# Patient Record
Sex: FEMALE | Race: WHITE | NOT HISPANIC OR LATINO | Employment: OTHER | URBAN - NONMETROPOLITAN AREA
[De-identification: names, ages, dates, MRNs, and addresses within clinical notes are randomized per-mention and may not be internally consistent; named-entity substitution may affect disease eponyms.]

---

## 2020-01-15 ENCOUNTER — TRANSCRIBE ORDERS (OUTPATIENT)
Dept: ADMINISTRATIVE | Facility: HOSPITAL | Age: 75
End: 2020-01-15

## 2020-01-15 DIAGNOSIS — M25.532 PAIN IN LEFT WRIST: Primary | ICD-10-CM

## 2021-02-10 ENCOUNTER — IMMUNIZATION (OUTPATIENT)
Dept: VACCINE CLINIC | Facility: HOSPITAL | Age: 76
End: 2021-02-10

## 2021-02-10 PROCEDURE — 0001A: CPT | Performed by: INTERNAL MEDICINE

## 2021-02-10 PROCEDURE — 91300 HC SARSCOV02 VAC 30MCG/0.3ML IM: CPT | Performed by: INTERNAL MEDICINE

## 2021-03-04 ENCOUNTER — IMMUNIZATION (OUTPATIENT)
Dept: VACCINE CLINIC | Facility: HOSPITAL | Age: 76
End: 2021-03-04

## 2021-03-04 PROCEDURE — 0002A: CPT | Performed by: INTERNAL MEDICINE

## 2021-03-04 PROCEDURE — 91300 HC SARSCOV02 VAC 30MCG/0.3ML IM: CPT | Performed by: INTERNAL MEDICINE

## 2021-05-19 ENCOUNTER — TRANSCRIBE ORDERS (OUTPATIENT)
Dept: ADMINISTRATIVE | Facility: HOSPITAL | Age: 76
End: 2021-05-19

## 2021-05-19 DIAGNOSIS — M75.21 BICIPITAL TENDONITIS OF RIGHT SHOULDER: Primary | ICD-10-CM

## 2021-07-26 ENCOUNTER — APPOINTMENT (OUTPATIENT)
Dept: CT IMAGING | Facility: HOSPITAL | Age: 76
End: 2021-07-26

## 2021-07-26 ENCOUNTER — APPOINTMENT (OUTPATIENT)
Dept: PREADMISSION TESTING | Facility: HOSPITAL | Age: 76
End: 2021-07-26

## 2021-08-06 ENCOUNTER — APPOINTMENT (OUTPATIENT)
Dept: PREADMISSION TESTING | Facility: HOSPITAL | Age: 76
End: 2021-08-06

## 2022-01-21 ENCOUNTER — TRANSCRIBE ORDERS (OUTPATIENT)
Dept: LAB | Facility: HOSPITAL | Age: 77
End: 2022-01-21

## 2022-01-21 ENCOUNTER — LAB (OUTPATIENT)
Dept: LAB | Facility: HOSPITAL | Age: 77
End: 2022-01-21

## 2022-01-21 DIAGNOSIS — J02.9 ACUTE PHARYNGITIS, UNSPECIFIED ETIOLOGY: ICD-10-CM

## 2022-01-21 DIAGNOSIS — J02.9 ACUTE PHARYNGITIS, UNSPECIFIED ETIOLOGY: Primary | ICD-10-CM

## 2022-01-21 LAB
B PARAPERT DNA SPEC QL NAA+PROBE: NOT DETECTED
B PERT DNA SPEC QL NAA+PROBE: NOT DETECTED
C PNEUM DNA NPH QL NAA+NON-PROBE: NOT DETECTED
FLUAV SUBTYP SPEC NAA+PROBE: NOT DETECTED
FLUBV RNA ISLT QL NAA+PROBE: NOT DETECTED
HADV DNA SPEC NAA+PROBE: NOT DETECTED
HCOV 229E RNA SPEC QL NAA+PROBE: NOT DETECTED
HCOV HKU1 RNA SPEC QL NAA+PROBE: NOT DETECTED
HCOV NL63 RNA SPEC QL NAA+PROBE: NOT DETECTED
HCOV OC43 RNA SPEC QL NAA+PROBE: NOT DETECTED
HMPV RNA NPH QL NAA+NON-PROBE: NOT DETECTED
HPIV1 RNA ISLT QL NAA+PROBE: NOT DETECTED
HPIV2 RNA SPEC QL NAA+PROBE: NOT DETECTED
HPIV3 RNA NPH QL NAA+PROBE: NOT DETECTED
HPIV4 P GENE NPH QL NAA+PROBE: NOT DETECTED
M PNEUMO IGG SER IA-ACNC: NOT DETECTED
RHINOVIRUS RNA SPEC NAA+PROBE: NOT DETECTED
RSV RNA NPH QL NAA+NON-PROBE: NOT DETECTED
S PYO AG THROAT QL: NEGATIVE
SARS-COV-2 RNA NPH QL NAA+NON-PROBE: DETECTED

## 2022-01-21 PROCEDURE — 87081 CULTURE SCREEN ONLY: CPT

## 2022-01-21 PROCEDURE — 87880 STREP A ASSAY W/OPTIC: CPT

## 2022-01-21 PROCEDURE — 0202U NFCT DS 22 TRGT SARS-COV-2: CPT

## 2022-01-23 LAB — BACTERIA SPEC AEROBE CULT: NORMAL

## 2023-11-11 ENCOUNTER — HOSPITAL ENCOUNTER (EMERGENCY)
Facility: HOSPITAL | Age: 78
Discharge: HOME | End: 2023-11-11
Attending: EMERGENCY MEDICINE
Payer: MEDICARE

## 2023-11-11 ENCOUNTER — APPOINTMENT (OUTPATIENT)
Dept: GENERAL RADIOLOGY | Facility: HOSPITAL | Age: 78
End: 2023-11-11
Payer: MEDICARE

## 2023-11-11 VITALS
WEIGHT: 128 LBS | SYSTOLIC BLOOD PRESSURE: 179 MMHG | TEMPERATURE: 97.9 F | BODY MASS INDEX: 21.85 KG/M2 | RESPIRATION RATE: 18 BRPM | DIASTOLIC BLOOD PRESSURE: 82 MMHG | OXYGEN SATURATION: 95 % | HEIGHT: 64 IN | HEART RATE: 62 BPM

## 2023-11-11 DIAGNOSIS — S82.832A CLOSED FRACTURE OF DISTAL END OF LEFT FIBULA, UNSPECIFIED FRACTURE MORPHOLOGY, INITIAL ENCOUNTER: Primary | ICD-10-CM

## 2023-11-11 PROCEDURE — 73610 X-RAY EXAM OF ANKLE: CPT

## 2023-11-11 PROCEDURE — 99282 EMERGENCY DEPT VISIT SF MDM: CPT

## 2023-11-11 PROCEDURE — 99283 EMERGENCY DEPT VISIT LOW MDM: CPT

## 2023-11-11 RX ORDER — NITROGLYCERIN 0.4 MG/1
0.4 TABLET SUBLINGUAL
COMMUNITY

## 2023-11-11 RX ORDER — METOPROLOL SUCCINATE 50 MG
50 TABLET, EXTENDED RELEASE 24 HR ORAL DAILY
COMMUNITY

## 2023-11-11 RX ORDER — LISINOPRIL 20 MG/1
20 TABLET ORAL DAILY
COMMUNITY

## 2023-11-11 RX ORDER — ROSUVASTATIN CALCIUM 5 MG/1
5 TABLET, COATED ORAL NIGHTLY
COMMUNITY

## 2023-11-11 RX ORDER — WARFARIN SODIUM 2 MG/1
2 TABLET ORAL 3 TIMES WEEKLY
COMMUNITY

## 2023-11-11 RX ORDER — SERTRALINE HYDROCHLORIDE 25 MG/1
25 TABLET, FILM COATED ORAL DAILY
COMMUNITY

## 2023-11-11 RX ORDER — OXYCODONE HYDROCHLORIDE AND ACETAMINOPHEN 5; 325 MG/1; MG/1
1 TABLET ORAL EVERY 6 HOURS PRN
Qty: 10 TABLET | Refills: 0 | Status: SHIPPED | OUTPATIENT
Start: 2023-11-11

## 2023-11-11 RX ORDER — WARFARIN SODIUM 4 MG/1
4 TABLET ORAL
COMMUNITY

## 2023-11-11 NOTE — DISCHARGE INSTRUCTIONS
Wear the boot until you are seen by an orthopedic provider. Use the pain medication as needed. Use your cane to help as well.

## 2023-11-11 NOTE — ED PROVIDER NOTES
"Subjective:  History of Present Illness:    Patient is a 78-year-old female here today with left ankle pain.  She states that she was at a car lot walking around when she stepped off the curb and injured her left ankle.  Since then she has had pain and swelling near the lateral malleolus.  Has been able to bear weight but is painful.  No other injury sustained.      Nurses Notes reviewed and agree, including vitals, allergies, social history and prior medical history.     REVIEW OF SYSTEMS: All systems reviewed and not pertinent unless noted.  Review of Systems    Past Medical History:   Diagnosis Date    Hyperlipidemia     Hypertension     MI (myocardial infarction)        Allergies:    Morphine and Sulfa antibiotics      Past Surgical History:   Procedure Laterality Date    CORONARY ANGIOPLASTY WITH STENT PLACEMENT      TUBAL ABDOMINAL LIGATION           Social History     Socioeconomic History    Marital status:    Tobacco Use    Smoking status: Former     Types: Cigarettes    Smokeless tobacco: Never   Vaping Use    Vaping Use: Never used   Substance and Sexual Activity    Alcohol use: Not Currently    Drug use: Never    Sexual activity: Yes     Partners: Male         History reviewed. No pertinent family history.    Objective  Physical Exam:  /82 (BP Location: Left arm, Patient Position: Sitting)   Pulse 62   Temp 97.9 °F (36.6 °C) (Oral)   Resp 18   Ht 162.6 cm (64\")   Wt 58.1 kg (128 lb)   SpO2 95%   BMI 21.97 kg/m²      Physical Exam  Vitals and nursing note reviewed.   Constitutional:       General: She is not in acute distress.     Appearance: Normal appearance. She is normal weight. She is not ill-appearing.   HENT:      Head: Normocephalic and atraumatic.      Nose: Nose normal.   Cardiovascular:      Rate and Rhythm: Normal rate.      Pulses: Normal pulses.   Pulmonary:      Effort: Pulmonary effort is normal.   Musculoskeletal:      Left ankle: Swelling present. Tenderness present " over the lateral malleolus. Decreased range of motion.   Skin:     General: Skin is warm and dry.      Capillary Refill: Capillary refill takes less than 2 seconds.   Neurological:      General: No focal deficit present.      Mental Status: She is alert and oriented to person, place, and time.   Psychiatric:         Mood and Affect: Mood normal.         Behavior: Behavior normal.         Procedures    ED Course:         Lab Results (last 24 hours)       ** No results found for the last 24 hours. **             XR Ankle 3+ View Left    Result Date: 11/11/2023  LEFT ANKLE  HISTORY: Fall off sidewalk.  FINDINGS:  A three view exam demonstrates an acute minimally displaced fracture of the lateral malleolus. No other fractures. Tibiotalar alignment is normal. Small plantar calcaneal spur.       Impression: Acute minimally displaced fracture of the lateral malleolus.   This report was signed and finalized on 11/11/2023 1:38 PM by Dr Dariusz Tierney DO.          [facility]      Initial impression of presenting illness: Patient is a 78-year-old female here today with left ankle pain.    DDX: includes but is not limited to: Fibular fracture, tibia fracture, ankle sprain, among others    Patient arrives hemodynamically stable with vitals interpreted by myself.     Pertinent features from physical exam: Ankle pain and swelling as noted above.    Initial diagnostic plan: Ankle x-ray placed by triage staff    Results from initial plan were reviewed and interpreted by me revealing fibular fracture noted.    Diagnostic information from other sources: Medical record    Interventions / Re-evaluation: Patient was offered pain medication but denied need.    Results/clinical rationale were discussed with Dr. Blake who also reviewed the x-rays.  Patient able to be placed in a long boot.  Offered crutches but preferred to use a cane instead.  Provided her with information for orthopedic follow-up.    Consultations/Discussion of results with other  physicians: None    Disposition plan: Patient discharged home in stable condition.  -----        Final diagnoses:   Closed fracture of distal end of left fibula, unspecified fracture morphology, initial encounter          Rob Keith, APRN  11/11/23 1989

## 2024-02-26 ENCOUNTER — HOSPITAL ENCOUNTER (OUTPATIENT)
Dept: LAB | Age: 79
Discharge: HOME | End: 2024-02-26

## 2024-02-26 DIAGNOSIS — R79.1 ABNORMAL COAGULATION PROFILE: Primary | ICD-10-CM

## 2024-02-26 DIAGNOSIS — D68.61 ANTIPHOSPHOLIPID SYNDROME (CMD): ICD-10-CM

## 2024-02-26 LAB
INR PPP: 1.9
PROTHROMBIN TIME: 19.8 SEC (ref 9.7–11.8)

## 2024-02-26 PROCEDURE — 36415 COLL VENOUS BLD VENIPUNCTURE: CPT | Performed by: INTERNAL MEDICINE

## 2024-02-26 PROCEDURE — 85610 PROTHROMBIN TIME: CPT | Performed by: INTERNAL MEDICINE

## 2024-02-28 ENCOUNTER — HOSPITAL ENCOUNTER (OUTPATIENT)
Dept: LAB | Age: 79
Discharge: HOME | End: 2024-02-28

## 2024-02-28 DIAGNOSIS — D68.61 ANTIPHOSPHOLIPID SYNDROME  (CMD): Primary | ICD-10-CM

## 2024-02-28 LAB
INR PPP: 2.2
PROTHROMBIN TIME: 22.8 SEC (ref 9.7–11.8)

## 2024-02-28 PROCEDURE — 85610 PROTHROMBIN TIME: CPT | Performed by: INTERNAL MEDICINE

## 2024-02-28 PROCEDURE — 36415 COLL VENOUS BLD VENIPUNCTURE: CPT | Performed by: INTERNAL MEDICINE

## 2024-04-02 ENCOUNTER — OFFICE VISIT (OUTPATIENT)
Dept: NEUROSURGERY | Facility: CLINIC | Age: 79
End: 2024-04-02
Payer: MEDICARE

## 2024-04-02 ENCOUNTER — TELEPHONE (OUTPATIENT)
Dept: NEUROSURGERY | Facility: CLINIC | Age: 79
End: 2024-04-02

## 2024-04-02 VITALS
WEIGHT: 117.9 LBS | BODY MASS INDEX: 20.13 KG/M2 | DIASTOLIC BLOOD PRESSURE: 70 MMHG | HEIGHT: 64 IN | SYSTOLIC BLOOD PRESSURE: 140 MMHG

## 2024-04-02 DIAGNOSIS — M54.6 PAIN IN THORACIC SPINE: Primary | ICD-10-CM

## 2024-04-02 PROCEDURE — 99204 OFFICE O/P NEW MOD 45 MIN: CPT | Performed by: PHYSICIAN ASSISTANT

## 2024-04-02 RX ORDER — PANTOPRAZOLE SODIUM 40 MG/1
40 TABLET, DELAYED RELEASE ORAL DAILY
COMMUNITY
Start: 2024-02-24

## 2024-04-02 RX ORDER — UBIDECARENONE 100 MG
100 CAPSULE ORAL DAILY
COMMUNITY

## 2024-04-02 RX ORDER — ACETAMINOPHEN 325 MG/1
325-650 TABLET ORAL EVERY 4 HOURS PRN
COMMUNITY

## 2024-04-02 RX ORDER — CLOPIDOGREL BISULFATE 75 MG/1
75 TABLET ORAL DAILY
COMMUNITY
Start: 2024-02-24

## 2024-04-02 RX ORDER — SODIUM CHLORIDE 9 MG/ML
1000 INJECTION, SOLUTION INTRAVENOUS
COMMUNITY
Start: 2024-02-20

## 2024-04-02 RX ORDER — WARFARIN SODIUM 4 MG/1
4 TABLET ORAL DAILY
COMMUNITY
Start: 2024-02-23

## 2024-04-02 RX ORDER — DICYCLOMINE HYDROCHLORIDE 10 MG/1
10 CAPSULE ORAL
COMMUNITY

## 2024-04-02 RX ORDER — ENOXAPARIN SODIUM 100 MG/ML
40 INJECTION SUBCUTANEOUS
COMMUNITY
Start: 2024-02-23

## 2024-04-02 RX ORDER — EVOLOCUMAB 140 MG/ML
140 INJECTION, SOLUTION SUBCUTANEOUS
COMMUNITY
Start: 2024-03-26

## 2024-04-02 NOTE — TELEPHONE ENCOUNTER
PATIENT CALLED IN AND STATES SHE IS STUCK IN TRAFFIC; SHOULD BE THERE IN ABOUT 46 MINUTES - ATTEMPTED WT AND NOT SUCCESSFUL.  PLEASE CALL PATIENT IF OK TO BE LATE OR TO RESCHEDULE     CALL HER [phone]     THANK YOU!

## 2024-04-02 NOTE — PROGRESS NOTES
Patient: Priscilla Purdy  : 1945  Chart #: [id number]    Date of Service: 2024    Chief Complaint   Patient presents with    Back Pain         Back Pain    Ms. Santiago is an 79-year-old female from [facility] who presents with chronic thoracic pain    Chronic Illnesses:    Past Medical History:   Diagnosis Date    Coronary artery disease     Hyperlipidemia     Hypertension     MI (myocardial infarction)          Past Surgical History:   Procedure Laterality Date    CAROTID STENT      CORONARY ANGIOPLASTY WITH STENT PLACEMENT      TUBAL ABDOMINAL LIGATION         Allergies   Allergen Reactions    Morphine Hives    Sulfa Antibiotics Hives         Current Outpatient Medications:     acetaminophen (TYLENOL) 325 MG tablet, Take 1-2 tablets by mouth Every 4 (Four) Hours As Needed., Disp: , Rfl:     clopidogrel (PLAVIX) 75 MG tablet, Take 1 tablet by mouth Daily., Disp: , Rfl:     coenzyme Q10 100 MG capsule, Take 1 capsule by mouth Daily., Disp: , Rfl:     dicyclomine (BENTYL) 10 MG capsule, Take 1 capsule by mouth., Disp: , Rfl:     Enoxaparin Sodium (LOVENOX) 40 MG/0.4ML solution prefilled syringe syringe, Inject 0.4 mL under the skin into the appropriate area as directed., Disp: , Rfl:     lisinopril (PRINIVIL,ZESTRIL) 20 MG tablet, Take 1 tablet by mouth Daily., Disp: , Rfl:     nitroglycerin (NITROSTAT) 0.4 MG SL tablet, Place 1 tablet under the tongue Every 5 (Five) Minutes As Needed., Disp: , Rfl:     oxyCODONE-acetaminophen (PERCOCET) 5-325 MG per tablet, Take 1 tablet by mouth Every 6 (Six) Hours As Needed for Severe Pain., Disp: 10 tablet, Rfl: 0    pantoprazole (PROTONIX) 40 MG EC tablet, Take 1 tablet by mouth Daily., Disp: , Rfl:     Repatha SureClick solution auto-injector SureClick injection, Inject 1 mL under the skin into the appropriate area as directed., Disp: , Rfl:     rosuvastatin (CRESTOR) 5 MG tablet, Take 1 tablet by mouth Every Night., Disp: , Rfl:     sertraline (ZOLOFT) 25 MG  tablet, Take 1 tablet by mouth Daily., Disp: , Rfl:     sodium chloride 0.9 % solution, Infuse 1,000 mL into a venous catheter., Disp: , Rfl:     Toprol XL 50 MG 24 hr tablet, Take 1 tablet by mouth Daily., Disp: , Rfl:     warfarin (COUMADIN) 2 MG tablet, Take 1 tablet by mouth 3 (Three) Times a Week., Disp: , Rfl:     warfarin (COUMADIN) 4 MG tablet, Take 1 tablet by mouth Daily., Disp: , Rfl:     lidocaine (LIDODERM) 5 %, Place 1 patch on the skin as directed by provider Daily. Remove & Discard patch within 12 hours or as directed by MD, Disp: 30 patch, Rfl: 1    tiZANidine (ZANAFLEX) 2 MG tablet, Take 1 tablet by mouth 2 (Two) Times a Day As Needed for Muscle Spasms., Disp: 20 tablet, Rfl: 1    warfarin (Coumadin) 4 MG tablet, Take 1 tablet by mouth 3 (Three) Times a Week if Needed. (Patient not taking: Reported on 4/2/2024), Disp: , Rfl:     Social History     Socioeconomic History    Marital status:    Tobacco Use    Smoking status: Former     Types: Cigarettes    Smokeless tobacco: Never   Vaping Use    Vaping status: Never Used   Substance and Sexual Activity    Alcohol use: Not Currently    Drug use: Never    Sexual activity: Yes     Partners: Male       History reviewed. No pertinent family history.    BMI is within normal parameters. No other follow-up for BMI required.       Social History    Tobacco Use      Smoking status: Former        Types: Cigarettes      Smokeless tobacco: Never       Review of Systems   Constitutional:  Positive for activity change, appetite change, chills and fatigue.   Musculoskeletal:  Positive for back pain.        Gait & Balance Assessment:  Risk assessment for falls. Fall precautions:  such as;   Using gait aids a cane, walker at the appropriate height at all times for ambulation or if necessary a wheelchair  Removing all area rugs and coffee tables to create a safe environment at home  Ensure clean, dry floors  Wearing supportive footwear and properly fitting  "clothing  Ensure bed/chair is appropriate height and patient's feet can touch the floor  Using a shower transfer bench  Using walk-in shower and having shower safety bars installed  Ensure proper lighting, minimize glare  Have nightlights operational and in use  Participation in an exercise program for gait training, balance training and strength  Avoid carrying laundry up and down steps  Ensure proper compliance and organization of medications to avoid errors   Avoid use of over the counter sedatives and alcohol consumption  Ensure easy access to call bell, glasses, TV control, telephone  Ensure glasses/hearing aids are in use or close by (on top of night table)     Physical examination:  Blood pressure 140/70, height 162.6 cm (64\"), weight 53.5 kg (117 lb 14.4 oz).  HEENT- normocephalic, atraumatic, sclera clear  Lungs-normal expansion, no wheezing  Heart-regular rate and rhythm  Extremities-positive pulses, no edema    Neurologic Exam  WDWNWF  A/A/C, speech clear, attention normal, conversant, answers questions appropriately, good historian.  Cranial nerves II through XII are intact.  Motor examination does not reveal weakness in the , upper or lower extremities.   Sensation is intact.  Gait is normal, balance is normal.     Radiographic Imaging:  For my review is a MRI from [place]. Pictures are poor for surgical planning.    Medical Decision Making  Diagnoses and all orders for this visit:    1. Pain in thoracic spine (Primary)  -     CT Thoracic Spine Without Contrast; Future    Other orders  -     lidocaine (LIDODERM) 5 %; Place 1 patch on the skin as directed by provider Daily. Remove & Discard patch within 12 hours or as directed by MD  Dispense: 30 patch; Refill: 1  -     Discontinue: TiZANidine (Zanaflex) 2 MG capsule; Take 1 capsule by mouth 2 (Two) Times a Day As Needed for Muscle Spasms.  Dispense: 20 capsule; Refill: 1         Any copied data from previous notes included in the (1) HPI, (2) PE, (3) " MDM and/or assessment and plan has been reviewed and is accurate as of this day.    Apple Marie, PAC    Patient Care Team:  Teddy East MD as PCP - General (Internal Medicine)  Teddy East MD as Primary Care Provider (Internal Medicine)  Los Tinsley MD as Consulting Physician (Neurosurgery)  Dorita Miner PA as Referring Physician (Pain Medicine)

## 2024-04-03 ENCOUNTER — TELEPHONE (OUTPATIENT)
Dept: NEUROSURGERY | Facility: CLINIC | Age: 79
End: 2024-04-03
Payer: MEDICARE

## 2024-04-03 PROBLEM — M54.6 PAIN IN THORACIC SPINE: Status: ACTIVE | Noted: 2024-04-03

## 2024-04-03 RX ORDER — TIZANIDINE HYDROCHLORIDE 2 MG/1
2 CAPSULE, GELATIN COATED ORAL 2 TIMES DAILY PRN
Qty: 20 CAPSULE | Refills: 1 | Status: SHIPPED | OUTPATIENT
Start: 2024-04-03 | End: 2024-04-05 | Stop reason: SDUPTHER

## 2024-04-03 RX ORDER — LIDOCAINE 50 MG/G
1 PATCH TOPICAL EVERY 24 HOURS
Qty: 30 PATCH | Refills: 1 | Status: SHIPPED | OUTPATIENT
Start: 2024-04-03

## 2024-04-03 NOTE — TELEPHONE ENCOUNTER
Provider:  Frank  Surgery/Procedure:  KAT  Surgery/Procedure Date:    Last visit:   4/2/24  Next visit: 4/1//24     Reason for call:  Patient called in regards to new prescriptions that were discussed yesterday, she has not received any new prescriptions at her pharmacy.

## 2024-04-05 DIAGNOSIS — M54.6 PAIN IN THORACIC SPINE: Primary | ICD-10-CM

## 2024-04-05 RX ORDER — TIZANIDINE 2 MG/1
2 TABLET ORAL 2 TIMES DAILY PRN
Qty: 20 TABLET | Refills: 1 | Status: SHIPPED | OUTPATIENT
Start: 2024-04-05

## 2024-04-10 ENCOUNTER — HOSPITAL ENCOUNTER (OUTPATIENT)
Dept: CT IMAGING | Facility: HOSPITAL | Age: 79
Discharge: HOME | End: 2024-04-10
Admitting: PHYSICIAN ASSISTANT
Payer: MEDICARE

## 2024-04-10 DIAGNOSIS — M54.6 PAIN IN THORACIC SPINE: ICD-10-CM

## 2024-04-10 PROCEDURE — 72128 CT CHEST SPINE W/O DYE: CPT

## 2024-04-15 ENCOUNTER — TELEPHONE (OUTPATIENT)
Dept: NEUROSURGERY | Facility: CLINIC | Age: 79
End: 2024-04-15
Payer: MEDICARE

## 2024-04-15 NOTE — TELEPHONE ENCOUNTER
Documentation Only: I have sent jeremias keys Tizanidine. Pending  This was denied because it had been capsule it was changed to tablets and patient picked them up.

## 2024-04-24 ENCOUNTER — OFFICE VISIT (OUTPATIENT)
Dept: NEUROSURGERY | Facility: CLINIC | Age: 79
End: 2024-04-24
Payer: MEDICARE

## 2024-04-24 VITALS
SYSTOLIC BLOOD PRESSURE: 122 MMHG | WEIGHT: 115 LBS | TEMPERATURE: 97.7 F | DIASTOLIC BLOOD PRESSURE: 60 MMHG | HEIGHT: 64 IN | BODY MASS INDEX: 19.63 KG/M2

## 2024-04-24 DIAGNOSIS — M54.6 PAIN IN THORACIC SPINE: Primary | ICD-10-CM

## 2024-04-24 PROBLEM — M47.815 OSTEOARTHRITIS OF THORACOLUMBAR SPINE: Status: ACTIVE | Noted: 2024-04-24

## 2024-04-24 PROCEDURE — 99214 OFFICE O/P EST MOD 30 MIN: CPT | Performed by: PHYSICIAN ASSISTANT

## 2024-04-24 RX ORDER — LEVOTHYROXINE SODIUM 88 UG/1
1 TABLET ORAL DAILY
COMMUNITY

## 2024-04-24 RX ORDER — BUTALBITAL, ACETAMINOPHEN AND CAFFEINE 50; 325; 40 MG/1; MG/1; MG/1
TABLET ORAL
COMMUNITY
Start: 2024-04-03

## 2024-04-24 NOTE — PROGRESS NOTES
Patient: Priscilla Purdy  : 1945  Chart #: [id number]    Date of Service: 2024    CC:  Back Pain  Pertinent negatives include no abdominal pain, chest pain, dysuria, fever, headaches, numbness or weakness.   Ms. Santiago is an 79-year-old female from [facility] who presents with chronic thoracic pain.  On today's follow-up visit with the patient and her spouse report that sometimes her pain will be from a different place in her thoracic spine and sometimes to the lateral right ribs.  She is inquiring about a brace.    Chronic Illnesses:  COPD  Past Medical History:   Diagnosis Date    Coronary artery disease     Hyperlipidemia     Hypertension     MI (myocardial infarction)          Past Surgical History:   Procedure Laterality Date    CAROTID STENT      CORONARY ANGIOPLASTY WITH STENT PLACEMENT      TUBAL ABDOMINAL LIGATION         Allergies   Allergen Reactions    Morphine Hives    Sulfa Antibiotics Hives    Nifedipine Headache and Rash     headache         Current Outpatient Medications:     acetaminophen (TYLENOL) 325 MG tablet, Take 1-2 tablets by mouth Every 4 (Four) Hours As Needed., Disp: , Rfl:     butalbital-acetaminophen-caffeine (FIORICET, ESGIC) -40 MG per tablet, Take 1 tablet every 4 hours by oral route., Disp: , Rfl:     clopidogrel (PLAVIX) 75 MG tablet, Take 1 tablet by mouth Daily., Disp: , Rfl:     coenzyme Q10 100 MG capsule, Take 1 capsule by mouth Daily., Disp: , Rfl:     dicyclomine (BENTYL) 10 MG capsule, Take 1 capsule by mouth., Disp: , Rfl:     lidocaine (LIDODERM) 5 %, Place 1 patch on the skin as directed by provider Daily. Remove & Discard patch within 12 hours or as directed by MD, Disp: 30 patch, Rfl: 1    lisinopril (PRINIVIL,ZESTRIL) 20 MG tablet, Take 1 tablet by mouth Daily., Disp: , Rfl:     nitroglycerin (NITROSTAT) 0.4 MG SL tablet, Place 1 tablet under the tongue Every 5 (Five) Minutes As Needed., Disp: , Rfl:     pantoprazole (PROTONIX) 40 MG EC tablet,  Take 1 tablet by mouth Daily., Disp: , Rfl:     Repatha SureClick solution auto-injector SureClick injection, Inject 1 mL under the skin into the appropriate area as directed., Disp: , Rfl:     rosuvastatin (CRESTOR) 5 MG tablet, Take 1 tablet by mouth Every Night., Disp: , Rfl:     sertraline (ZOLOFT) 25 MG tablet, Take 1 tablet by mouth Daily., Disp: , Rfl:     sodium chloride 0.9 % solution, Infuse 1,000 mL into a venous catheter., Disp: , Rfl:     tiZANidine (ZANAFLEX) 2 MG tablet, Take 1 tablet by mouth 2 (Two) Times a Day As Needed for Muscle Spasms., Disp: 20 tablet, Rfl: 1    Toprol XL 50 MG 24 hr tablet, Take 1 tablet by mouth Daily., Disp: , Rfl:     warfarin (COUMADIN) 2 MG tablet, Take 1 tablet by mouth 3 (Three) Times a Week., Disp: , Rfl:     warfarin (Coumadin) 4 MG tablet, Take 1 tablet by mouth 3 (Three) Times a Week if Needed., Disp: , Rfl:     warfarin (COUMADIN) 4 MG tablet, Take 1 tablet by mouth Daily., Disp: , Rfl:     Enoxaparin Sodium (LOVENOX) 40 MG/0.4ML solution prefilled syringe syringe, Inject 0.4 mL under the skin into the appropriate area as directed. (Patient not taking: Reported on 4/24/2024), Disp: , Rfl:     levothyroxine (SYNTHROID, LEVOTHROID) 88 MCG tablet, Take 1 tablet by mouth Daily., Disp: , Rfl:     Social History     Socioeconomic History    Marital status:    Tobacco Use    Smoking status: Former     Types: Cigarettes     Passive exposure: Never    Smokeless tobacco: Never   Vaping Use    Vaping status: Never Used   Substance and Sexual Activity    Alcohol use: Not Currently    Drug use: Never    Sexual activity: Yes     Partners: Male       No family history on file.    BMI is within normal parameters. No other follow-up for BMI required.       Social History    Tobacco Use      Smoking status: Former        Types: Cigarettes        Passive exposure: Never      Smokeless tobacco: Never       Review of Systems   Constitutional:  Positive for activity change,  appetite change, chills and fatigue. Negative for diaphoresis, fever and unexpected weight change.   HENT:  Positive for rhinorrhea and tinnitus. Negative for congestion, dental problem, drooling, ear discharge, ear pain, facial swelling, hearing loss, mouth sores, nosebleeds, postnasal drip, sinus pressure, sinus pain, sneezing, sore throat, trouble swallowing and voice change.    Eyes:  Negative for photophobia, pain, discharge, redness, itching and visual disturbance.   Respiratory:  Negative for apnea, cough, choking, chest tightness, shortness of breath, wheezing and stridor.    Cardiovascular:  Negative for chest pain, palpitations and leg swelling.   Gastrointestinal:  Negative for abdominal distention, abdominal pain, anal bleeding, blood in stool, constipation, diarrhea, nausea, rectal pain and vomiting.   Endocrine: Positive for cold intolerance. Negative for heat intolerance, polydipsia, polyphagia and polyuria.   Genitourinary:  Positive for flank pain. Negative for decreased urine volume, difficulty urinating, dysuria, enuresis, frequency, genital sores, hematuria and urgency.   Musculoskeletal:  Positive for back pain. Negative for arthralgias, gait problem, joint swelling, myalgias, neck pain and neck stiffness.   Skin:  Negative for color change, pallor, rash and wound.   Allergic/Immunologic: Negative for environmental allergies, food allergies and immunocompromised state.   Neurological:  Negative for dizziness, tremors, seizures, syncope, facial asymmetry, speech difficulty, weakness, light-headedness, numbness and headaches.   Hematological:  Negative for adenopathy. Does not bruise/bleed easily.   Psychiatric/Behavioral:  Negative for agitation, behavioral problems, confusion, decreased concentration, dysphoric mood, hallucinations, self-injury, sleep disturbance and suicidal ideas. The patient is not nervous/anxious and is not hyperactive.    All other systems reviewed and are negative.    "    Gait & Balance Assessment:  Risk assessment for falls. Fall precautions:  such as;   Using gait aids a cane, walker at the appropriate height at all times for ambulation or if necessary a wheelchair  Removing all area rugs and coffee tables to create a safe environment at home  Ensure clean, dry floors  Wearing supportive footwear and properly fitting clothing  Ensure bed/chair is appropriate height and patient's feet can touch the floor  Using a shower transfer bench  Using walk-in shower and having shower safety bars installed  Ensure proper lighting, minimize glare  Have nightlights operational and in use  Participation in an exercise program for gait training, balance training and strength  Avoid carrying laundry up and down steps  Ensure proper compliance and organization of medications to avoid errors   Avoid use of over the counter sedatives and alcohol consumption  Ensure easy access to call bell, glasses, TV control, telephone  Ensure glasses/hearing aids are in use or close by (on top of night table)     Physical examination:  Blood pressure 122/60, temperature 97.7 °F (36.5 °C), temperature source Infrared, height 162.6 cm (64\"), weight 52.2 kg (115 lb).  HEENT- normocephalic, atraumatic, sclera clear  Lungs-normal expansion, no wheezing  Heart-regular rate and rhythm  Extremities-positive pulses, no edema    Neurologic Exam  WDWNWF  A/A/C, speech clear, attention normal, conversant, answers questions appropriately, good historian.  Cranial nerves II through XII are intact.  Motor examination does not reveal weakness in the , upper or lower extremities.   Sensation is intact.  Gait is normal, balance is normal.     Radiographic Imaging:  For my review, CT scan of the thoracic spine, 4/10/2024 shows sclerotic spondylitic endplate changes at T11-12 and L1-2.  Thoracic kyphosis is noted.  There is a mild rightward scoliotic thoracic curvature.      Medical Decision Making  Diagnoses and all orders " for this visit:    1. Pain in thoracic spine (Primary)    I have ordered the patient a custom anterior-opening Flexfoam TLSO for spine stabilization, comfort and pain control. The patient requires a custom brace because of kyphosis, age (elderly) , frailness, and pain that prevents them from wearing a prefabricated brace. The patient must wear the brace when up and out of bed during activities for the next 8-12 weeks. The patient can don/doff the TLSO in the standing position.   She is currently in physical therapy and I have encouraged her to continue this program and do daily exercises at home.  I have also talked with her  and her about daily use of Voltaren cream and they are going to work out a schedule.  I would like to have a follow-up telemedicine visit in approximately 2 weeks to see how she is doing.    Any copied data from previous notes included in the (1) HPI, (2) PE, (3) MDM and/or assessment and plan has been reviewed and is accurate as of this day.    Apple Marie, PAC    Patient Care Team:  Teddy East MD as PCP - General (Internal Medicine)  Teddy East MD as Primary Care Provider (Internal Medicine)  Los Tinsley MD as Consulting Physician (Neurosurgery)  Dorita Miner PA as Referring Physician (Pain Medicine)

## 2024-05-23 ENCOUNTER — OFFICE VISIT (OUTPATIENT)
Dept: PULMONOLOGY | Facility: CLINIC | Age: 79
End: 2024-05-23
Payer: MEDICARE

## 2024-05-23 VITALS
OXYGEN SATURATION: 97 % | SYSTOLIC BLOOD PRESSURE: 124 MMHG | HEART RATE: 62 BPM | DIASTOLIC BLOOD PRESSURE: 60 MMHG | BODY MASS INDEX: 20.42 KG/M2 | RESPIRATION RATE: 18 BRPM | WEIGHT: 119.6 LBS | HEIGHT: 64 IN

## 2024-05-23 DIAGNOSIS — Z87.891 PERSONAL HISTORY OF NICOTINE DEPENDENCE: ICD-10-CM

## 2024-05-23 DIAGNOSIS — R06.02 SHORTNESS OF BREATH: Primary | ICD-10-CM

## 2024-05-23 DIAGNOSIS — R06.02 SHORTNESS OF BREATH: ICD-10-CM

## 2024-05-23 DIAGNOSIS — J43.9 PULMONARY EMPHYSEMA, UNSPECIFIED EMPHYSEMA TYPE: ICD-10-CM

## 2024-05-23 DIAGNOSIS — R93.89 ABNORMAL CT OF THE CHEST: ICD-10-CM

## 2024-05-23 RX ORDER — FUROSEMIDE 20 MG/1
20 TABLET ORAL 2 TIMES DAILY
COMMUNITY

## 2024-05-23 NOTE — PROGRESS NOTES
"  CONSULT NOTE    Requested by:   Teddy East*   Teddy East MD      Chief Complaint   Patient presents with    Breathing Problem    Consult       Subjective:  Priscilla Purdy is a 79 y.o. female.   Patient comes in today for consultation because of shortness of breath.    The patient says that she has noticed occasional shortness of breath with exertion, especially when climbing uphill, over the past few years and actually had myocardial infarction in February 2024 requiring stent placements.    The patient says that despite stent placements, her symptoms never completely improved     The patient says that she used to smoke 1 PPD for about 10-15 years. Quit 45 years ago.     She also says that her  and her mother smoked heavily indoors.     The patient has been exposed to smoke in the form of wood for heating occasionally.    The following portions of the patient's history were reviewed and updated as appropriate: allergies, current medications, past family history, past medical history, past social history, and past surgical history.    Review of Systems   HENT:  Negative for sinus pressure, sneezing and sore throat.    Respiratory:  Positive for cough. Negative for chest tightness, shortness of breath and wheezing.    Psychiatric/Behavioral:  Negative for sleep disturbance.    All other systems reviewed and are negative.      Past Medical History:   Diagnosis Date    Coronary artery disease     Hyperlipidemia     Hypertension     MI (myocardial infarction)        Social History     Tobacco Use    Smoking status: Former     Types: Cigarettes     Passive exposure: Never    Smokeless tobacco: Never   Substance Use Topics    Alcohol use: Not Currently         Objective:  Visit Vitals  /60   Pulse 62   Resp 18   Ht 162.6 cm (64.02\")   Wt 54.3 kg (119 lb 9.6 oz)   SpO2 97%   BMI 20.52 kg/m²       Physical Exam  Vitals reviewed.   Constitutional:       Appearance: She is " well-developed.   HENT:      Nose:      Comments: Mild nasal erythema noted.     Mouth/Throat:      Mouth: Mucous membranes are moist.   Neck:      Vascular: No JVD.   Cardiovascular:      Rate and Rhythm: Normal rate and regular rhythm.   Pulmonary:      Effort: Pulmonary effort is normal.      Breath sounds: No wheezing or rales.      Comments: Somewhat hyperresonant to percussion.  Somewhat decreased air entry.  No wheezing noted.   Musculoskeletal:      Cervical back: Neck supple.      Right lower leg: No edema.      Left lower leg: No edema.      Comments: Gait was normal.   Skin:     General: Skin is warm and dry.   Neurological:      Mental Status: She is alert and oriented to person, place, and time.         Assessment/Plan:  Diagnoses and all orders for this visit:    1. Shortness of breath (Primary)  -     Complete PFT - Pre & Post Bronchodilator; Future    2. Pulmonary emphysema, unspecified emphysema type  -     Complete PFT - Pre & Post Bronchodilator; Future  -     Alpha - 1 - Antitrypsin Phenotype; Future  -     CT Chest Without Contrast Diagnostic; Future    3. Personal history of nicotine dependence    4. Abnormal CT of the chest  -     Alpha - 1 - Antitrypsin Phenotype; Future  -     CT Chest Without Contrast Diagnostic; Future    Other orders  -     tiotropium bromide-olodaterol (STIOLTO RESPIMAT) 2.5-2.5 MCG/ACT aerosol solution inhaler; Inhale 2 puffs Daily.  Dispense: 1 each; Refill: 5        Return in about 4 months (around 9/23/2024) for Recheck, Imaging, Labs, For [facility] ([place]), ....Also 10 mths w/ Dr. Up.    DISCUSSION(if any):  I have also reviewed her primary care / referring provider's last note that mentions dyspnea.     ===========================  ===========================    Last CT scan results was reviewed in great detail with the patient.  March 2024.  1. No evidence of pulmonary embolism.     2. Moderate emphysema.     3. Patchy multifocal mild groundglass opacities  "and cystic change in the upper lobes. Findings are nonspecific, but could be related to infectious/inflammatory process or scarring. Recommend attention on follow-up chest CT in 6 months.     4. Mild mediastinal and hilar lymphadenopathy. Recommend attention on follow-up, as above.       Laboratory workup also showed   Lab Results   Component Value Date    HGB 12.6 02/23/2024    HGB 11.7 02/22/2024    HGB 14.1 02/20/2024   ,   Lab Results   Component Value Date    HCT 39.8 02/23/2024    HCT 36.7 02/22/2024    HCT 44.0 02/20/2024     Laboratory workup also showed No results found for: \"CO2\",   Lab Results   Component Value Date    HGBA1C 5.3 02/20/2024   ,   Lab Results   Component Value Date    TSH 0.783 03/29/2024    TSH 1.693 10/23/2023    TSH 0.297 (L) 08/04/2023     PFTs were reviewed. Moderate obstruction    ===========================  ===========================    Orders as above    Based on history and physical exam, it seems that her shortness of breath is most likely from obstructive lung disease.     Patient was educated on compliance with, and the correct method of using the pulmonary medicines.     Side effects, of prescribed medicines, discussed.    I have told her that we will made further recommendations, based on clinical response.     The patient will need repeat CT in 6 months from the last CT.     Patient was given reading material, as appropriate.     I have encouraged the patient to call or schedule a visit earlier, if there are any concerns.        Dictated utilizing Dragon dictation.    This document was electronically signed by Edith Up MD on 05/23/24 at 14:24 EDT    "

## 2024-05-24 ENCOUNTER — OFFICE VISIT (OUTPATIENT)
Dept: NEUROSURGERY | Facility: CLINIC | Age: 79
End: 2024-05-24
Payer: MEDICARE

## 2024-05-24 VITALS — WEIGHT: 118 LBS | BODY MASS INDEX: 20.14 KG/M2 | TEMPERATURE: 98 F | HEIGHT: 64 IN

## 2024-05-24 DIAGNOSIS — M54.6 PAIN IN THORACIC SPINE: Primary | ICD-10-CM

## 2024-05-24 NOTE — PROGRESS NOTES
Patient: Priscilla Purdy  : 1945  Chart #: [id number]    Date of Service: 2024    Chief Complaint   Patient presents with    Back Pain     HPI: Ms. Purdy is an 79-year-old female who presents in follow-up with chronic thoracic back pain. Her pain is about 2 inches away from the middle of her back and radiates around the sides. She has been trying a custom fitted brace. It's uncomfortable near her armpit area. She recently went on a trip to [place] and did take it, and noticed she might be a little more sore without it.  Voltaren cream was not effective, but lidocaine was helpful. She participated with physical therapy which was also not helpful.     Chronic Illnesses:    Past Medical History:   Diagnosis Date    Coronary artery disease     Hyperlipidemia     Hypertension     MI (myocardial infarction)          Past Surgical History:   Procedure Laterality Date    CAROTID STENT      CORONARY ANGIOPLASTY WITH STENT PLACEMENT      TUBAL ABDOMINAL LIGATION         Allergies   Allergen Reactions    Morphine Hives    Sulfa Antibiotics Hives    Nifedipine Headache and Rash     headache         Current Outpatient Medications:     butalbital-acetaminophen-caffeine (FIORICET, ESGIC) -40 MG per tablet, Take 1 tablet every 4 hours by oral route., Disp: , Rfl:     clopidogrel (PLAVIX) 75 MG tablet, Take 1 tablet by mouth Daily., Disp: , Rfl:     coenzyme Q10 100 MG capsule, Take 1 capsule by mouth Daily., Disp: , Rfl:     dicyclomine (BENTYL) 10 MG capsule, Take 1 capsule by mouth., Disp: , Rfl:     empagliflozin (JARDIANCE) 10 MG tablet tablet, Take  by mouth Daily., Disp: , Rfl:     empagliflozin (Jardiance) 10 MG tablet tablet, Take 1 tablet by mouth Daily., Disp: , Rfl:     Enoxaparin Sodium (LOVENOX) 40 MG/0.4ML solution prefilled syringe syringe, Inject 0.4 mL under the skin into the appropriate area as directed., Disp: , Rfl:     furosemide (LASIX) 20 MG tablet, Take 1 tablet by mouth 2 (Two) Times  a Day., Disp: , Rfl:     Gel Base gel, Use 2 g 4 (Four) Times a Day. KETAMINE 10%, GABAPENTIN 6%, LIDOCAINE 5%, AMITRIPTYLINE 2%, BUPIVACAINE 2%, MELOXICAM 0.1%, Disp: 240 g, Rfl: 5    levothyroxine (SYNTHROID, LEVOTHROID) 88 MCG tablet, Take 1 tablet by mouth Daily., Disp: , Rfl:     lidocaine (LIDODERM) 5 %, Place 1 patch on the skin as directed by provider Daily. Remove & Discard patch within 12 hours or as directed by MD, Disp: 30 patch, Rfl: 1    lisinopril (PRINIVIL,ZESTRIL) 20 MG tablet, Take 1 tablet by mouth Daily., Disp: , Rfl:     nitroglycerin (NITROSTAT) 0.4 MG SL tablet, Place 1 tablet under the tongue Every 5 (Five) Minutes As Needed., Disp: , Rfl:     pantoprazole (PROTONIX) 40 MG EC tablet, Take 1 tablet by mouth Daily., Disp: , Rfl:     Repatha SureClick solution auto-injector SureClick injection, Inject 1 mL under the skin into the appropriate area as directed., Disp: , Rfl:     rosuvastatin (CRESTOR) 5 MG tablet, Take 1 tablet by mouth Every Night., Disp: , Rfl:     sertraline (ZOLOFT) 25 MG tablet, Take 1 tablet by mouth Daily., Disp: , Rfl:     sodium chloride 0.9 % solution, Infuse 1,000 mL into a venous catheter., Disp: , Rfl:     tiotropium bromide-olodaterol (STIOLTO RESPIMAT) 2.5-2.5 MCG/ACT aerosol solution inhaler, Inhale 2 puffs Daily., Disp: 1 each, Rfl: 5    tiZANidine (ZANAFLEX) 2 MG tablet, Take 1 tablet by mouth 2 (Two) Times a Day As Needed for Muscle Spasms., Disp: 20 tablet, Rfl: 1    Toprol XL 50 MG 24 hr tablet, Take 1 tablet by mouth Daily., Disp: , Rfl:     warfarin (COUMADIN) 2 MG tablet, Take 1 tablet by mouth 3 (Three) Times a Week., Disp: , Rfl:     warfarin (Coumadin) 4 MG tablet, Take 1 tablet by mouth 3 (Three) Times a Week if Needed., Disp: , Rfl:     warfarin (COUMADIN) 4 MG tablet, Take 1 tablet by mouth Daily., Disp: , Rfl:     Social History     Socioeconomic History    Marital status:    Tobacco Use    Smoking status: Former     Types: Cigarettes      Passive exposure: Never    Smokeless tobacco: Never   Vaping Use    Vaping status: Never Used   Substance and Sexual Activity    Alcohol use: Not Currently    Drug use: Never    Sexual activity: Yes     Partners: Male       History reviewed. No pertinent family history.    BMI is within normal parameters. No other follow-up for BMI required.    Review of Systems   Constitutional:  Negative for activity change, appetite change, chills, diaphoresis, fatigue, fever and unexpected weight change.   HENT:  Negative for congestion, dental problem, drooling, ear discharge, ear pain, facial swelling, hearing loss, mouth sores, nosebleeds, postnasal drip, rhinorrhea, sinus pressure, sinus pain, sneezing, sore throat, tinnitus, trouble swallowing and voice change.    Eyes:  Negative for photophobia, pain, discharge, redness, itching and visual disturbance.   Respiratory:  Negative for apnea, cough, choking, chest tightness, shortness of breath, wheezing and stridor.    Cardiovascular:  Negative for chest pain, palpitations and leg swelling.   Gastrointestinal:  Negative for abdominal distention, abdominal pain, anal bleeding, blood in stool, constipation, diarrhea, nausea, rectal pain and vomiting.   Endocrine: Negative for cold intolerance, heat intolerance, polydipsia, polyphagia and polyuria.   Genitourinary:  Negative for decreased urine volume, difficulty urinating, dysuria, enuresis, flank pain, frequency, genital sores, hematuria and urgency.   Musculoskeletal:  Positive for back pain. Negative for arthralgias, gait problem, joint swelling, myalgias, neck pain and neck stiffness.   Skin:  Negative for color change, pallor, rash and wound.   Allergic/Immunologic: Negative for environmental allergies, food allergies and immunocompromised state.   Neurological:  Negative for dizziness, tremors, seizures, syncope, facial asymmetry, speech difficulty, weakness, light-headedness, numbness and headaches.   Hematological:   "Negative for adenopathy. Does not bruise/bleed easily.   Psychiatric/Behavioral:  Negative for agitation, behavioral problems, confusion, decreased concentration, dysphoric mood, hallucinations, self-injury, sleep disturbance and suicidal ideas. The patient is not nervous/anxious and is not hyperactive.    All other systems reviewed and are negative.       Physical examination:  Temperature 98 °F (36.7 °C), temperature source Temporal, height 162.6 cm (64\"), weight 53.5 kg (118 lb).    Constitutional: The patient is well developed, well nourished. She appears younger than her stated age.     HEENT: normocephalic, atraumatic, sclera clear    Musculoskeletal:   Motor examination does not reveal weakness in the , upper or lower extremities.   Lumbar extension is a little stiff, otherwise normal spine ROM.   Tenderness to palpation along the paravertebral area in the T8/T9 area.   Kyphotic posture.    Dermatological: No rashes or lesions noted.     Neurologic Exam  A/A/C, speech clear, attention normal, conversant, answers questions appropriately, good historian.  Sensation is intact.  Gait is normal, balance is normal.   No tremors are noted.  Reflexes are intact.   Ariza is negative. Clonus is negative.     Radiographic Imaging:  For my review CT of the thoracic spine without contrast demonstrates diffuse demineralization.  There is a sclerotic spondylitic endplate change at T11-12 and L1-2.  No evidence of fracture.  Evidence of exaggerated kyphosis.    Medical Decision Making  Diagnoses and all orders for this visit:    1. Pain in thoracic spine (Primary)    Other orders  -     Discontinue: Gel Base gel; Use 2 g 4 (Four) Times a Day. KETAMINE 10%, GABAPENTIN 6%, LIDOCAINE 5%, AMITRIPTYLINE 2%, BUPIVACAINE 2%, MELOXICAM 0.1%  Dispense: 240 g; Refill: 5  -     Discontinue: Gel Base gel; Use 2 g 4 (Four) Times a Day. KETAMINE 10%, GABAPENTIN 6%, LIDOCAINE 5%, AMITRIPTYLINE 2%, BUPIVACAINE 2%, MELOXICAM 0.1%  " Dispense: 240 g; Refill: 5  -     Gel Base gel; Use 2 g 4 (Four) Times a Day. KETAMINE 10%, GABAPENTIN 6%, LIDOCAINE 5%, AMITRIPTYLINE 2%, BUPIVACAINE 2%, MELOXICAM 0.1%  Dispense: 240 g; Refill: 5       Ms. Purdy has not made much progress, but she only recently began wearing her brace. I have encouraged her to continue trying it during activities when she is standing for longer periods of time, or walking. I have also prescribed her a compound cream as she is not getting much relief with Voltaren. At this time, I do not see a role for neurosurgical intervention. I have encouraged her to follow up with her pain management provider to explore other injections or treatment modalities. She will follow up with our office on an as-needed basis.     Any copied data from previous notes included in the (1) HPI, (2) PE, (3) MDM and/or assessment and plan has been reviewed and is accurate as of this day.    Kinza Clifford PA-C     Patient Care Team:  Teddy East MD as PCP - General (Internal Medicine)  Teddy East MD as Primary Care Provider (Internal Medicine)  Los Tinsley MD as Consulting Physician (Neurosurgery)  Dorita Miner PA as Referring Physician (Pain Medicine)

## 2024-06-14 ENCOUNTER — TELEPHONE (OUTPATIENT)
Dept: PULMONOLOGY | Facility: CLINIC | Age: 79
End: 2024-06-14

## 2024-06-14 NOTE — TELEPHONE ENCOUNTER
Hub staff attempted to follow warm transfer process and was unsuccessful     Caller: Priscilla Purdy    Relationship to patient: Self    Best call back number: [phone]    Patient is needing: PT IS IN NEED OF A WRITTEN PRESCIRPTION  FOR STIOLTO RESPIMAT 3-MONTH SUPPLY FOR 1 YEAR WORTH OF REFILLS    PLEASE DO NOT SEND TO PHARMACY, MAIL WRITTEN PRESCIPTION TO THE PT.  PT TAKING PRESCRIPTION TO [facility] TO BE FILLED.    PLEASE CALL PT WHEN MAILED.

## 2024-07-08 NOTE — TELEPHONE ENCOUNTER
Hub staff attempted to follow warm transfer process and was unsuccessful     Caller: Priscilla Purdy    Relationship to patient: Self    Best call back number: [phone]     Patient is needing: PT IS COMING BY TO PICK THIS UP TODAY. PLEASE ADVISE.

## 2024-07-08 NOTE — TELEPHONE ENCOUNTER
Patient wanting Stiolto sent in to [facility] pharmacy in [place] at  [facility].

## 2024-09-30 DIAGNOSIS — J43.9 PULMONARY EMPHYSEMA, UNSPECIFIED EMPHYSEMA TYPE: ICD-10-CM

## 2024-09-30 DIAGNOSIS — R93.89 ABNORMAL CT OF THE CHEST: ICD-10-CM

## 2025-01-07 ENCOUNTER — TRANSCRIBE ORDERS (OUTPATIENT)
Dept: GENERAL RADIOLOGY | Facility: HOSPITAL | Age: 80
End: 2025-01-07
Payer: MEDICARE

## 2025-01-07 ENCOUNTER — HOSPITAL ENCOUNTER (OUTPATIENT)
Dept: MRI IMAGING | Facility: HOSPITAL | Age: 80
Discharge: HOME | End: 2025-01-07
Admitting: INTERNAL MEDICINE
Payer: MEDICARE

## 2025-01-07 DIAGNOSIS — M25.511 RIGHT SHOULDER PAIN, UNSPECIFIED CHRONICITY: ICD-10-CM

## 2025-01-07 DIAGNOSIS — M25.511 RIGHT SHOULDER PAIN, UNSPECIFIED CHRONICITY: Primary | ICD-10-CM

## 2025-01-07 PROCEDURE — 73221 MRI JOINT UPR EXTREM W/O DYE: CPT

## 2025-03-18 ENCOUNTER — OFFICE VISIT (OUTPATIENT)
Dept: PULMONOLOGY | Facility: CLINIC | Age: 80
End: 2025-03-18
Payer: MEDICARE

## 2025-03-18 VITALS
OXYGEN SATURATION: 97 % | BODY MASS INDEX: 21.17 KG/M2 | WEIGHT: 124 LBS | HEART RATE: 67 BPM | SYSTOLIC BLOOD PRESSURE: 124 MMHG | RESPIRATION RATE: 18 BRPM | DIASTOLIC BLOOD PRESSURE: 70 MMHG | HEIGHT: 64 IN

## 2025-03-18 DIAGNOSIS — J44.9 CHRONIC OBSTRUCTIVE PULMONARY DISEASE, UNSPECIFIED COPD TYPE: Primary | ICD-10-CM

## 2025-03-18 DIAGNOSIS — Z01.811 PREOP PULMONARY/RESPIRATORY EXAM: ICD-10-CM

## 2025-03-18 RX ORDER — ALBUTEROL SULFATE 90 UG/1
2 INHALANT RESPIRATORY (INHALATION) EVERY 4 HOURS PRN
Qty: 18 G | Refills: 5 | Status: SHIPPED | OUTPATIENT
Start: 2025-03-18

## 2025-03-18 NOTE — PROGRESS NOTES
"  Chief Complaint   Patient presents with    Shortness of Breath    Follow-up         Subjective   Priscilla Purdy is a 80 y.o. female.   Patient was evaluated today for follow up of shortness of breath and COPD.     Patient reports 1 recent exacerbations requiring antibiotics.  The patient was evaluated in primary care office for her recent exacerbation.    Patient is using Stiolto, as prescribed. Exercise tolerance has also remained stable.     She does not seem to know if she has albuterol inhaler.    Quit smoking 40 years ago.     She is also undergoing shoulder surgery.       The following portions of the patient's history were reviewed and updated as appropriate: allergies, current medications, past family history, past medical history, past social history, and past surgical history.    Review of Systems   HENT:  Negative for sinus pressure, sneezing and sore throat.    Respiratory:  Positive for cough, shortness of breath (some) and wheezing. Negative for chest tightness.    Psychiatric/Behavioral:  Negative for sleep disturbance.        Objective   Visit Vitals  /70   Pulse 67   Resp 18   Ht 162.6 cm (64.02\")   Wt 56.2 kg (124 lb)   SpO2 97%   BMI 21.27 kg/m²       BMI Readings from Last 8 Encounters:   03/18/25 21.27 kg/m²   05/24/24 20.25 kg/m²   05/23/24 20.52 kg/m²   04/24/24 19.74 kg/m²   04/02/24 20.24 kg/m²   11/11/23 21.97 kg/m²       Physical Exam  Vitals reviewed.   Constitutional:       Appearance: She is well-developed.   HENT:      Head: Normocephalic and atraumatic.   Eyes:      Extraocular Movements: Extraocular movements intact.   Cardiovascular:      Rate and Rhythm: Normal rate.   Pulmonary:      Comments: Somewhat hyperresonant to percussion.  Somewhat decreased air entry.  No obvious wheezing noted.   Musculoskeletal:      Cervical back: Neck supple.   Neurological:      Mental Status: She is alert.           Assessment & Plan   Diagnoses and all orders for this visit:    1. Chronic " obstructive pulmonary disease, unspecified COPD type (Primary)    2. Preop pulmonary/respiratory exam    Other orders  -     tiotropium bromide-olodaterol (STIOLTO RESPIMAT) 2.5-2.5 MCG/ACT aerosol solution inhaler; Inhale 2 puffs Daily.  Dispense: 3 each; Refill: 2  -     albuterol sulfate HFA (Ventolin HFA) 108 (90 Base) MCG/ACT inhaler; Inhale 2 puffs Every 4 (Four) Hours As Needed for Wheezing or Shortness of Air.  Dispense: 18 g; Refill: 5           Return in about 8 months (around 11/18/2025) for Recheck, For Zita Aguilar).    DISCUSSION (if any):  Last CT scan results was reviewed in great detail with the patient.  Results for orders placed in visit on 09/30/24    CT Chest Without Contrast Diagnostic  COPD noted.     Latest available PFTs were reviewed.  Consistent with moderate obstruction. Performed in May 2024.    We have reviewed her pulmonary medications in great detail.    Compliance with medications stressed.     Side effects of prescribed medications discussed with the patient    From Pulmonary stand point, her risk for the proposed procedure appears to be.     Moderate     Postoperative recommendations:            * Out of bed to chair, as soon as feasible.            * Albuterol & Atrovent nebulizers Q4hr PRN            * Incentive spirometry every hour.            * Minimize the use of NG tube.            * Keep O2sat at 88% or more, with minimum supplemental O2.            * Minimize anesthesia time, as much as possible    Pulmonary risk stratification needs to be discussed with the physician performing the procedure and, apart from procedures involving pulmonary resection, should not be used alone to determine patient's appropriateness for the planned procedure.      Dictated utilizing Dragon dictation.    This document was electronically signed by Edith Up MD on 03/18/25 at 14:02 EDT

## 2025-04-10 ENCOUNTER — PRE-ADMISSION TESTING (OUTPATIENT)
Dept: PREADMISSION TESTING | Facility: HOSPITAL | Age: 80
End: 2025-04-10
Payer: MEDICARE

## 2025-04-10 VITALS — BODY MASS INDEX: 21.72 KG/M2 | WEIGHT: 127.21 LBS | HEIGHT: 64 IN

## 2025-04-10 LAB
ALBUMIN SERPL-MCNC: 4 G/DL (ref 3.5–5.2)
ALBUMIN/GLOB SERPL: 1.4 G/DL
ALP SERPL-CCNC: 79 U/L (ref 39–117)
ALT SERPL W P-5'-P-CCNC: 9 U/L (ref 1–33)
ANION GAP SERPL CALCULATED.3IONS-SCNC: 8 MMOL/L (ref 5–15)
APTT PPP: 32 SECONDS (ref 22–39)
AST SERPL-CCNC: 21 U/L (ref 1–32)
BASOPHILS # BLD AUTO: 0.04 10*3/MM3 (ref 0–0.2)
BASOPHILS NFR BLD AUTO: 0.8 % (ref 0–1.5)
BILIRUB SERPL-MCNC: 0.3 MG/DL (ref 0–1.2)
BUN SERPL-MCNC: 22 MG/DL (ref 8–23)
BUN/CREAT SERPL: 21 (ref 7–25)
CALCIUM SPEC-SCNC: 8.8 MG/DL (ref 8.6–10.5)
CHLORIDE SERPL-SCNC: 109 MMOL/L (ref 98–107)
CO2 SERPL-SCNC: 22 MMOL/L (ref 22–29)
CREAT SERPL-MCNC: 1.05 MG/DL (ref 0.57–1)
DEPRECATED RDW RBC AUTO: 50 FL (ref 37–54)
EGFRCR SERPLBLD CKD-EPI 2021: 53.5 ML/MIN/1.73
EOSINOPHIL # BLD AUTO: 0.27 10*3/MM3 (ref 0–0.4)
EOSINOPHIL NFR BLD AUTO: 5.4 % (ref 0.3–6.2)
ERYTHROCYTE [DISTWIDTH] IN BLOOD BY AUTOMATED COUNT: 13.7 % (ref 12.3–15.4)
GLOBULIN UR ELPH-MCNC: 2.9 GM/DL
GLUCOSE SERPL-MCNC: 92 MG/DL (ref 65–99)
HBA1C MFR BLD: 5.5 % (ref 4.8–5.6)
HCT VFR BLD AUTO: 40.6 % (ref 34–46.6)
HGB BLD-MCNC: 12.8 G/DL (ref 12–15.9)
IMM GRANULOCYTES # BLD AUTO: 0.04 10*3/MM3 (ref 0–0.05)
IMM GRANULOCYTES NFR BLD AUTO: 0.8 % (ref 0–0.5)
INR PPP: 2.25 (ref 0.89–1.12)
LYMPHOCYTES # BLD AUTO: 1.26 10*3/MM3 (ref 0.7–3.1)
LYMPHOCYTES NFR BLD AUTO: 25.1 % (ref 19.6–45.3)
MCH RBC QN AUTO: 31.1 PG (ref 26.6–33)
MCHC RBC AUTO-ENTMCNC: 31.5 G/DL (ref 31.5–35.7)
MCV RBC AUTO: 98.8 FL (ref 79–97)
MONOCYTES # BLD AUTO: 0.54 10*3/MM3 (ref 0.1–0.9)
MONOCYTES NFR BLD AUTO: 10.8 % (ref 5–12)
NEUTROPHILS NFR BLD AUTO: 2.86 10*3/MM3 (ref 1.7–7)
NEUTROPHILS NFR BLD AUTO: 57.1 % (ref 42.7–76)
NRBC BLD AUTO-RTO: 0 /100 WBC (ref 0–0.2)
PLATELET # BLD AUTO: 196 10*3/MM3 (ref 140–450)
PMV BLD AUTO: 10.9 FL (ref 6–12)
POTASSIUM SERPL-SCNC: 4.5 MMOL/L (ref 3.5–5.2)
PROT SERPL-MCNC: 6.9 G/DL (ref 6–8.5)
PROTHROMBIN TIME: 26.4 SECONDS (ref 12.2–15.3)
RBC # BLD AUTO: 4.11 10*6/MM3 (ref 3.77–5.28)
SODIUM SERPL-SCNC: 139 MMOL/L (ref 136–145)
WBC NRBC COR # BLD AUTO: 5.01 10*3/MM3 (ref 3.4–10.8)

## 2025-04-10 PROCEDURE — 93010 ELECTROCARDIOGRAM REPORT: CPT | Performed by: INTERNAL MEDICINE

## 2025-04-10 PROCEDURE — 85025 COMPLETE CBC W/AUTO DIFF WBC: CPT

## 2025-04-10 PROCEDURE — 83036 HEMOGLOBIN GLYCOSYLATED A1C: CPT

## 2025-04-10 PROCEDURE — 93005 ELECTROCARDIOGRAM TRACING: CPT

## 2025-04-10 PROCEDURE — 85730 THROMBOPLASTIN TIME PARTIAL: CPT

## 2025-04-10 PROCEDURE — 80053 COMPREHEN METABOLIC PANEL: CPT

## 2025-04-10 PROCEDURE — 85610 PROTHROMBIN TIME: CPT

## 2025-04-10 PROCEDURE — 36415 COLL VENOUS BLD VENIPUNCTURE: CPT

## 2025-04-10 RX ORDER — DIAPER,BRIEF,ADULT, DISPOSABLE
1 EACH MISCELLANEOUS DAILY
COMMUNITY

## 2025-04-10 RX ORDER — LANOLIN ALCOHOL/MO/W.PET/CERES
1000 CREAM (GRAM) TOPICAL DAILY
COMMUNITY

## 2025-04-10 RX ORDER — FLUTICASONE PROPIONATE 50 MCG
2 SPRAY, SUSPENSION (ML) NASAL DAILY
COMMUNITY

## 2025-04-10 RX ORDER — MULTIVIT-MIN/IRON/FOLIC ACID/K 18-600-40
1 CAPSULE ORAL DAILY
COMMUNITY

## 2025-04-10 RX ORDER — MULTIVIT WITH MINERALS/LUTEIN
400 TABLET ORAL DAILY
COMMUNITY

## 2025-04-10 NOTE — PAT
An arrival time for procedure was not provided during PAT visit. If patient had any questions or concerns about their arrival time, they were instructed to contact their surgeon/physician.  Additionally, if the patient referred to an arrival time that was acquired from their my chart account, patient was encouraged to verify that time with their surgeon/physician. Arrival times are NOT provided in Pre Admission Testing Department.    Per Anesthesia Request, patient instructed not to take their ACE/ARB medications on the AM of surgery.    Patient instructed to drink 20 ounces of Gatorade or Gatorlyte (if diabetic) and it needs to be completed 1 hour (for Main OR patients) or 2 hours (scheduled  section & [facility] patients) before given arrival time for procedure (NO RED Gatorade and NO Gatorade Zero).    Patient verbalized understanding.    Patient denies any current skin issues.     Patient's surgeon called in a prescription for Benzol Peroxide 5% wash to [facility].  Patient instructed to  from [facility] pharmacy that was submitted electronically.  If prescription not available, instructed to purchase the wash over the counter since a prescription is not required. Verbal and written instructions given regarding proper use of the Benzoyl Peroxide wash were provided to patient and/or famlily during PAT visit. Patient/family also instructed to complete Benzol Peroxide checklist and return it to Pre-op on the day of surgery.  Patient and/or family verbalized understanding.      Additionally, reinforced with patient to acquire this prescription from the [facility] pharmacy before leaving the hospital after PAT visit due to the potential unavailability at local pharmacies.    Bactroban to be applied to each nostril during PAT visit if surgery the following day.  If surgery NOT the following day, then Bactroban supplied to patient with instructions both written and verbally to insert Bactroban into each  alejo the night before surgery.    Dr. Delcid office to fax cardiac clearance.    Patient instructed to contact Dr. Delcid office for blood thinner instructions.

## 2025-04-10 NOTE — PAT
Verified patient previously completed cardiology visit for cardiac risk assessment in preparation for upcoming procedure, completion of 12-lead ECG within six months, and risk assessment letter reviewed. No further interventions required.     Patient cleared by cardiologist at  on 3/12/25.  Patient needs to bridge with Lovenox per instructions provided to the patient by .  Chart forwarded to  pre op on 4/10/25

## 2025-04-11 LAB
QT INTERVAL: 452 MS
QTC INTERVAL: 443 MS

## 2025-04-23 ENCOUNTER — ANESTHESIA EVENT (OUTPATIENT)
Dept: PERIOP | Facility: HOSPITAL | Age: 80
End: 2025-04-23
Payer: MEDICARE

## 2025-04-24 ENCOUNTER — ANESTHESIA (OUTPATIENT)
Dept: PERIOP | Facility: HOSPITAL | Age: 80
End: 2025-04-24
Payer: MEDICARE

## 2025-04-24 ENCOUNTER — HOSPITAL ENCOUNTER (OUTPATIENT)
Facility: HOSPITAL | Age: 80
Discharge: HOME | End: 2025-04-26
Attending: ORTHOPAEDIC SURGERY | Admitting: ORTHOPAEDIC SURGERY
Payer: MEDICARE

## 2025-04-24 ENCOUNTER — APPOINTMENT (OUTPATIENT)
Dept: GENERAL RADIOLOGY | Facility: HOSPITAL | Age: 80
End: 2025-04-24
Payer: MEDICARE

## 2025-04-24 ENCOUNTER — ANESTHESIA EVENT CONVERTED (OUTPATIENT)
Dept: ANESTHESIOLOGY | Facility: HOSPITAL | Age: 80
End: 2025-04-24
Payer: MEDICARE

## 2025-04-24 PROBLEM — M12.819 ROTATOR CUFF ARTHROPATHY: Status: ACTIVE | Noted: 2025-04-24

## 2025-04-24 PROBLEM — E78.5 HYPERLIPIDEMIA: Status: ACTIVE | Noted: 2025-04-24

## 2025-04-24 PROBLEM — Z96.611 STATUS POST REVERSE TOTAL REPLACEMENT OF RIGHT SHOULDER: Status: ACTIVE | Noted: 2025-04-24

## 2025-04-24 PROBLEM — M12.811 ROTATOR CUFF ARTHROPATHY OF RIGHT SHOULDER: Status: ACTIVE | Noted: 2025-04-24

## 2025-04-24 PROBLEM — D68.61 ANTICARDIOLIPIN SYNDROME: Status: ACTIVE | Noted: 2025-04-24

## 2025-04-24 PROBLEM — J44.9 COPD (CHRONIC OBSTRUCTIVE PULMONARY DISEASE): Status: ACTIVE | Noted: 2025-04-24

## 2025-04-24 PROBLEM — I10 HTN (HYPERTENSION): Status: ACTIVE | Noted: 2025-04-24

## 2025-04-24 PROBLEM — I25.10 CAD (CORONARY ARTERY DISEASE): Status: ACTIVE | Noted: 2025-04-24

## 2025-04-24 LAB
INR PPP: 0.97 (ref 0.89–1.12)
INR PPP: 0.99 (ref 0.89–1.12)
POTASSIUM SERPL-SCNC: 3.9 MMOL/L (ref 3.5–5.2)
PROTHROMBIN TIME: 13.5 SECONDS (ref 12.2–15.3)
PROTHROMBIN TIME: 13.7 SECONDS (ref 12.2–15.3)

## 2025-04-24 PROCEDURE — 63710000001 WARFARIN 4 MG TABLET: Performed by: INTERNAL MEDICINE

## 2025-04-24 PROCEDURE — G0378 HOSPITAL OBSERVATION PER HR: HCPCS

## 2025-04-24 PROCEDURE — 97110 THERAPEUTIC EXERCISES: CPT | Performed by: OCCUPATIONAL THERAPIST

## 2025-04-24 PROCEDURE — 25010000002 DEXAMETHASONE SODIUM PHOSPHATE 10 MG/ML SOLUTION

## 2025-04-24 PROCEDURE — 25010000002 VANCOMYCIN 1 G RECONSTITUTED SOLUTION: Performed by: ORTHOPAEDIC SURGERY

## 2025-04-24 PROCEDURE — 85610 PROTHROMBIN TIME: CPT | Performed by: INTERNAL MEDICINE

## 2025-04-24 PROCEDURE — 97165 OT EVAL LOW COMPLEX 30 MIN: CPT | Performed by: OCCUPATIONAL THERAPIST

## 2025-04-24 PROCEDURE — C1776 JOINT DEVICE (IMPLANTABLE): HCPCS | Performed by: ORTHOPAEDIC SURGERY

## 2025-04-24 PROCEDURE — 25010000002 ONDANSETRON PER 1 MG: Performed by: ORTHOPAEDIC SURGERY

## 2025-04-24 PROCEDURE — 25010000002 BUPIVACAINE (PF) 0.25 % SOLUTION

## 2025-04-24 PROCEDURE — 97535 SELF CARE MNGMENT TRAINING: CPT | Performed by: OCCUPATIONAL THERAPIST

## 2025-04-24 PROCEDURE — A9270 NON-COVERED ITEM OR SERVICE: HCPCS | Performed by: INTERNAL MEDICINE

## 2025-04-24 PROCEDURE — 85610 PROTHROMBIN TIME: CPT | Performed by: ORTHOPAEDIC SURGERY

## 2025-04-24 PROCEDURE — 25010000002 ONDANSETRON PER 1 MG

## 2025-04-24 PROCEDURE — 25010000002 LIDOCAINE PF 1% 1 % SOLUTION: Performed by: NURSE ANESTHETIST, CERTIFIED REGISTERED

## 2025-04-24 PROCEDURE — 73020 X-RAY EXAM OF SHOULDER: CPT

## 2025-04-24 PROCEDURE — 25010000002 SUGAMMADEX 200 MG/2ML SOLUTION: Performed by: NURSE ANESTHETIST, CERTIFIED REGISTERED

## 2025-04-24 PROCEDURE — 25010000002 CEFAZOLIN PER 500 MG: Performed by: ORTHOPAEDIC SURGERY

## 2025-04-24 PROCEDURE — 25010000002 FENTANYL CITRATE (PF) 50 MCG/ML SOLUTION

## 2025-04-24 PROCEDURE — 25010000002 PROPOFOL 10 MG/ML EMULSION: Performed by: NURSE ANESTHETIST, CERTIFIED REGISTERED

## 2025-04-24 PROCEDURE — C1713 ANCHOR/SCREW BN/BN,TIS/BN: HCPCS | Performed by: ORTHOPAEDIC SURGERY

## 2025-04-24 PROCEDURE — 25010000002 DROPERIDOL PER 5 MG

## 2025-04-24 PROCEDURE — 25010000002 DEXAMETHASONE PER 1 MG: Performed by: NURSE ANESTHETIST, CERTIFIED REGISTERED

## 2025-04-24 PROCEDURE — 63710000001 LISINOPRIL 20 MG TABLET: Performed by: INTERNAL MEDICINE

## 2025-04-24 PROCEDURE — 84132 ASSAY OF SERUM POTASSIUM: CPT | Performed by: ANESTHESIOLOGY

## 2025-04-24 PROCEDURE — 25010000002 ROPIVACAINE HCL-NACL 0.2-0.9 % SOLUTION

## 2025-04-24 PROCEDURE — 25010000002 LIDOCAINE PF 1% 1 % SOLUTION: Performed by: ANESTHESIOLOGY

## 2025-04-24 PROCEDURE — 25810000003 LACTATED RINGERS PER 1000 ML: Performed by: ANESTHESIOLOGY

## 2025-04-24 PROCEDURE — 63710000001 EMPAGLIFLOZIN 10 MG TABLET: Performed by: INTERNAL MEDICINE

## 2025-04-24 PROCEDURE — 25010000002 BUPIVACAINE LIPOSOME 1.3 % SUSPENSION

## 2025-04-24 PROCEDURE — 25010000002 BUPIVACAINE (PF) 0.5 % SOLUTION

## 2025-04-24 PROCEDURE — 25010000002 PHENYLEPHRINE 10 MG/ML SOLUTION 1 ML VIAL: Performed by: NURSE ANESTHETIST, CERTIFIED REGISTERED

## 2025-04-24 DEVICE — IMPLANTABLE DEVICE
Type: IMPLANTABLE DEVICE | Site: SHOULDER | Status: FUNCTIONAL
Brand: TORNIER PERFORM™ REVERSED

## 2025-04-24 DEVICE — CP TOTL REV SHLDR PERFORM STEM RSA PERF REV LAT/AUG VIT/E: Type: IMPLANTABLE DEVICE | Site: SHOULDER | Status: FUNCTIONAL

## 2025-04-24 DEVICE — SCRW AEQUALIS PERFORM PERIPH 5X22MM: Type: IMPLANTABLE DEVICE | Site: SHOULDER | Status: FUNCTIONAL

## 2025-04-24 DEVICE — IMPLANTABLE DEVICE
Type: IMPLANTABLE DEVICE | Site: SHOULDER | Status: FUNCTIONAL
Brand: TORNIER PERFORM® REVERSED AUGMENTED GLENOID

## 2025-04-24 DEVICE — SCRW AEQUALIS PERFORM PERIPH 5X30MM: Type: IMPLANTABLE DEVICE | Site: SHOULDER | Status: FUNCTIONAL

## 2025-04-24 DEVICE — SCRW PERIPH 5X34MM: Type: IMPLANTABLE DEVICE | Site: SHOULDER | Status: FUNCTIONAL

## 2025-04-24 DEVICE — IMPLANTABLE DEVICE: Type: IMPLANTABLE DEVICE | Site: SHOULDER | Status: FUNCTIONAL

## 2025-04-24 DEVICE — SCRW AEQUALIS PERFORM CENTRL 6.5X35MM: Type: IMPLANTABLE DEVICE | Site: SHOULDER | Status: FUNCTIONAL

## 2025-04-24 DEVICE — GUIDEPIN HUM PREFORM 3X100MM: Type: IMPLANTABLE DEVICE | Site: SHOULDER | Status: FUNCTIONAL

## 2025-04-24 DEVICE — ABSORBABLE HEMOSTAT (OXIDIZED REGENERATED CELLULOSE)
Type: IMPLANTABLE DEVICE | Site: SHOULDER | Status: FUNCTIONAL
Brand: SURGICEL

## 2025-04-24 RX ORDER — FENTANYL CITRATE 50 UG/ML
50 INJECTION, SOLUTION INTRAMUSCULAR; INTRAVENOUS
Status: DISCONTINUED | OUTPATIENT
Start: 2025-04-24 | End: 2025-04-26 | Stop reason: HOSPADM

## 2025-04-24 RX ORDER — SERTRALINE HYDROCHLORIDE 25 MG/1
12.5 TABLET, FILM COATED ORAL DAILY
Status: DISCONTINUED | OUTPATIENT
Start: 2025-04-24 | End: 2025-04-26 | Stop reason: HOSPADM

## 2025-04-24 RX ORDER — TRANEXAMIC ACID 10 MG/ML
1000 INJECTION, SOLUTION INTRAVENOUS ONCE
Status: DISCONTINUED | OUTPATIENT
Start: 2025-04-24 | End: 2025-04-24

## 2025-04-24 RX ORDER — ROCURONIUM BROMIDE 10 MG/ML
INJECTION, SOLUTION INTRAVENOUS AS NEEDED
Status: DISCONTINUED | OUTPATIENT
Start: 2025-04-24 | End: 2025-04-24 | Stop reason: SURG

## 2025-04-24 RX ORDER — BUTALBITAL, ACETAMINOPHEN AND CAFFEINE 50; 325; 40 MG/1; MG/1; MG/1
1 TABLET ORAL EVERY 6 HOURS PRN
Status: DISCONTINUED | OUTPATIENT
Start: 2025-04-24 | End: 2025-04-26 | Stop reason: HOSPADM

## 2025-04-24 RX ORDER — DEXAMETHASONE SODIUM PHOSPHATE 10 MG/ML
INJECTION, SOLUTION INTRAMUSCULAR; INTRAVENOUS
Status: COMPLETED | OUTPATIENT
Start: 2025-04-24 | End: 2025-04-24

## 2025-04-24 RX ORDER — FENTANYL CITRATE 50 UG/ML
INJECTION, SOLUTION INTRAMUSCULAR; INTRAVENOUS
Status: COMPLETED
Start: 2025-04-24 | End: 2025-04-24

## 2025-04-24 RX ORDER — LEVOTHYROXINE SODIUM 88 UG/1
88 TABLET ORAL DAILY
Status: DISCONTINUED | OUTPATIENT
Start: 2025-04-25 | End: 2025-04-26 | Stop reason: HOSPADM

## 2025-04-24 RX ORDER — ONDANSETRON 2 MG/ML
4 INJECTION INTRAMUSCULAR; INTRAVENOUS EVERY 6 HOURS PRN
Status: DISCONTINUED | OUTPATIENT
Start: 2025-04-24 | End: 2025-04-26 | Stop reason: HOSPADM

## 2025-04-24 RX ORDER — PREGABALIN 75 MG/1
75 CAPSULE ORAL ONCE
Status: DISCONTINUED | OUTPATIENT
Start: 2025-04-24 | End: 2025-04-24 | Stop reason: HOSPADM

## 2025-04-24 RX ORDER — ACETAMINOPHEN 325 MG/1
650 TABLET ORAL EVERY 4 HOURS PRN
Status: DISCONTINUED | OUTPATIENT
Start: 2025-04-24 | End: 2025-04-26 | Stop reason: HOSPADM

## 2025-04-24 RX ORDER — SODIUM CHLORIDE 450 MG/100ML
50 INJECTION, SOLUTION INTRAVENOUS CONTINUOUS
Status: ACTIVE | OUTPATIENT
Start: 2025-04-24 | End: 2025-04-25

## 2025-04-24 RX ORDER — HYDROMORPHONE HYDROCHLORIDE 1 MG/ML
0.5 INJECTION, SOLUTION INTRAMUSCULAR; INTRAVENOUS; SUBCUTANEOUS
Status: DISCONTINUED | OUTPATIENT
Start: 2025-04-24 | End: 2025-04-26 | Stop reason: HOSPADM

## 2025-04-24 RX ORDER — LIDOCAINE HYDROCHLORIDE 10 MG/ML
0.5 INJECTION, SOLUTION EPIDURAL; INFILTRATION; INTRACAUDAL; PERINEURAL ONCE AS NEEDED
Status: COMPLETED | OUTPATIENT
Start: 2025-04-24 | End: 2025-04-24

## 2025-04-24 RX ORDER — PANTOPRAZOLE SODIUM 40 MG/1
40 TABLET, DELAYED RELEASE ORAL DAILY
Status: DISCONTINUED | OUTPATIENT
Start: 2025-04-25 | End: 2025-04-26 | Stop reason: HOSPADM

## 2025-04-24 RX ORDER — SODIUM CHLORIDE, SODIUM LACTATE, POTASSIUM CHLORIDE, CALCIUM CHLORIDE 600; 310; 30; 20 MG/100ML; MG/100ML; MG/100ML; MG/100ML
9 INJECTION, SOLUTION INTRAVENOUS CONTINUOUS
Status: DISCONTINUED | OUTPATIENT
Start: 2025-04-25 | End: 2025-04-24

## 2025-04-24 RX ORDER — FAMOTIDINE 20 MG/1
20 TABLET, FILM COATED ORAL ONCE
Status: COMPLETED | OUTPATIENT
Start: 2025-04-24 | End: 2025-04-24

## 2025-04-24 RX ORDER — TRANEXAMIC ACID 10 MG/ML
1000 INJECTION, SOLUTION INTRAVENOUS ONCE
Status: COMPLETED | OUTPATIENT
Start: 2025-04-24 | End: 2025-04-24

## 2025-04-24 RX ORDER — NALOXONE HCL 0.4 MG/ML
0.1 VIAL (ML) INJECTION
Status: DISCONTINUED | OUTPATIENT
Start: 2025-04-24 | End: 2025-04-26 | Stop reason: HOSPADM

## 2025-04-24 RX ORDER — ARFORMOTEROL TARTRATE 15 UG/2ML
15 SOLUTION RESPIRATORY (INHALATION)
Status: DISCONTINUED | OUTPATIENT
Start: 2025-04-24 | End: 2025-04-26 | Stop reason: HOSPADM

## 2025-04-24 RX ORDER — LIDOCAINE HYDROCHLORIDE 10 MG/ML
INJECTION, SOLUTION EPIDURAL; INFILTRATION; INTRACAUDAL; PERINEURAL AS NEEDED
Status: DISCONTINUED | OUTPATIENT
Start: 2025-04-24 | End: 2025-04-24 | Stop reason: SURG

## 2025-04-24 RX ORDER — ONDANSETRON 2 MG/ML
INJECTION INTRAMUSCULAR; INTRAVENOUS
Status: COMPLETED
Start: 2025-04-24 | End: 2025-04-24

## 2025-04-24 RX ORDER — DROPERIDOL 2.5 MG/ML
INJECTION, SOLUTION INTRAMUSCULAR; INTRAVENOUS
Status: COMPLETED
Start: 2025-04-24 | End: 2025-04-24

## 2025-04-24 RX ORDER — MIDAZOLAM HYDROCHLORIDE 1 MG/ML
0.5 INJECTION, SOLUTION INTRAMUSCULAR; INTRAVENOUS
Status: DISCONTINUED | OUTPATIENT
Start: 2025-04-24 | End: 2025-04-24 | Stop reason: HOSPADM

## 2025-04-24 RX ORDER — LISINOPRIL 20 MG/1
20 TABLET ORAL 2 TIMES DAILY
Status: DISCONTINUED | OUTPATIENT
Start: 2025-04-24 | End: 2025-04-26 | Stop reason: HOSPADM

## 2025-04-24 RX ORDER — BUPIVACAINE HCL/0.9 % NACL/PF 0.125 %
PLASTIC BAG, INJECTION (ML) EPIDURAL AS NEEDED
Status: DISCONTINUED | OUTPATIENT
Start: 2025-04-24 | End: 2025-04-24 | Stop reason: SURG

## 2025-04-24 RX ORDER — FAMOTIDINE 10 MG/ML
20 INJECTION, SOLUTION INTRAVENOUS ONCE
Status: DISCONTINUED | OUTPATIENT
Start: 2025-04-24 | End: 2025-04-24 | Stop reason: HOSPADM

## 2025-04-24 RX ORDER — BUPIVACAINE HYDROCHLORIDE 5 MG/ML
INJECTION, SOLUTION EPIDURAL; INTRACAUDAL; PERINEURAL
Status: COMPLETED | OUTPATIENT
Start: 2025-04-24 | End: 2025-04-24

## 2025-04-24 RX ORDER — ACETAMINOPHEN 650 MG/1
650 SUPPOSITORY RECTAL EVERY 4 HOURS PRN
Status: DISCONTINUED | OUTPATIENT
Start: 2025-04-24 | End: 2025-04-26 | Stop reason: HOSPADM

## 2025-04-24 RX ORDER — WARFARIN SODIUM 4 MG/1
6 TABLET ORAL
Status: COMPLETED | OUTPATIENT
Start: 2025-04-24 | End: 2025-04-24

## 2025-04-24 RX ORDER — ROPIVACAINE HYDROCHLORIDE 2 MG/ML
INJECTION, SOLUTION EPIDURAL; INFILTRATION; PERINEURAL CONTINUOUS
Status: DISCONTINUED | OUTPATIENT
Start: 2025-04-24 | End: 2025-04-26 | Stop reason: HOSPADM

## 2025-04-24 RX ORDER — ONDANSETRON 4 MG/1
4 TABLET, ORALLY DISINTEGRATING ORAL EVERY 6 HOURS PRN
Status: DISCONTINUED | OUTPATIENT
Start: 2025-04-24 | End: 2025-04-26 | Stop reason: HOSPADM

## 2025-04-24 RX ORDER — BUPIVACAINE HYDROCHLORIDE 2.5 MG/ML
INJECTION, SOLUTION EPIDURAL; INFILTRATION; INTRACAUDAL; PERINEURAL
Status: COMPLETED | OUTPATIENT
Start: 2025-04-24 | End: 2025-04-24

## 2025-04-24 RX ORDER — PROPOFOL 10 MG/ML
VIAL (ML) INTRAVENOUS AS NEEDED
Status: DISCONTINUED | OUTPATIENT
Start: 2025-04-24 | End: 2025-04-24 | Stop reason: SURG

## 2025-04-24 RX ORDER — SODIUM CHLORIDE 0.9 % (FLUSH) 0.9 %
10 SYRINGE (ML) INJECTION AS NEEDED
Status: DISCONTINUED | OUTPATIENT
Start: 2025-04-24 | End: 2025-04-24 | Stop reason: HOSPADM

## 2025-04-24 RX ORDER — LABETALOL HYDROCHLORIDE 5 MG/ML
INJECTION, SOLUTION INTRAVENOUS AS NEEDED
Status: DISCONTINUED | OUTPATIENT
Start: 2025-04-24 | End: 2025-04-24 | Stop reason: SURG

## 2025-04-24 RX ORDER — ACETAMINOPHEN 500 MG
1000 TABLET ORAL ONCE
Status: DISCONTINUED | OUTPATIENT
Start: 2025-04-24 | End: 2025-04-24 | Stop reason: HOSPADM

## 2025-04-24 RX ORDER — DOCUSATE SODIUM 100 MG/1
100 CAPSULE, LIQUID FILLED ORAL 2 TIMES DAILY PRN
Status: DISCONTINUED | OUTPATIENT
Start: 2025-04-24 | End: 2025-04-26 | Stop reason: HOSPADM

## 2025-04-24 RX ORDER — SODIUM CHLORIDE 0.9 % (FLUSH) 0.9 %
10 SYRINGE (ML) INJECTION EVERY 12 HOURS SCHEDULED
Status: DISCONTINUED | OUTPATIENT
Start: 2025-04-24 | End: 2025-04-24 | Stop reason: HOSPADM

## 2025-04-24 RX ORDER — ALBUTEROL SULFATE 0.83 MG/ML
2.5 SOLUTION RESPIRATORY (INHALATION) EVERY 6 HOURS PRN
Status: DISCONTINUED | OUTPATIENT
Start: 2025-04-24 | End: 2025-04-26 | Stop reason: HOSPADM

## 2025-04-24 RX ORDER — DEXAMETHASONE SODIUM PHOSPHATE 4 MG/ML
INJECTION, SOLUTION INTRA-ARTICULAR; INTRALESIONAL; INTRAMUSCULAR; INTRAVENOUS; SOFT TISSUE AS NEEDED
Status: DISCONTINUED | OUTPATIENT
Start: 2025-04-24 | End: 2025-04-24 | Stop reason: SURG

## 2025-04-24 RX ORDER — FENTANYL CITRATE 50 UG/ML
INJECTION, SOLUTION INTRAMUSCULAR; INTRAVENOUS
Status: COMPLETED | OUTPATIENT
Start: 2025-04-24 | End: 2025-04-24

## 2025-04-24 RX ORDER — VANCOMYCIN HYDROCHLORIDE 1 G/20ML
INJECTION, POWDER, LYOPHILIZED, FOR SOLUTION INTRAVENOUS AS NEEDED
Status: DISCONTINUED | OUTPATIENT
Start: 2025-04-24 | End: 2025-04-24 | Stop reason: HOSPADM

## 2025-04-24 RX ORDER — OXYCODONE HYDROCHLORIDE 5 MG/1
5 TABLET ORAL EVERY 4 HOURS PRN
Status: DISCONTINUED | OUTPATIENT
Start: 2025-04-24 | End: 2025-04-26 | Stop reason: HOSPADM

## 2025-04-24 RX ORDER — ONDANSETRON 2 MG/ML
4 INJECTION INTRAMUSCULAR; INTRAVENOUS ONCE AS NEEDED
Status: DISCONTINUED | OUTPATIENT
Start: 2025-04-24 | End: 2025-04-24

## 2025-04-24 RX ADMIN — FAMOTIDINE 20 MG: 20 TABLET, FILM COATED ORAL at 11:45

## 2025-04-24 RX ADMIN — PHENYLEPHRINE HYDROCHLORIDE 0.1 MCG/KG/MIN: 10 INJECTION INTRAVENOUS at 12:31

## 2025-04-24 RX ADMIN — WARFARIN SODIUM 6 MG: 4 TABLET ORAL at 18:48

## 2025-04-24 RX ADMIN — ONDANSETRON 4 MG: 2 INJECTION INTRAMUSCULAR; INTRAVENOUS at 14:27

## 2025-04-24 RX ADMIN — FENTANYL CITRATE 50 MCG: 50 INJECTION, SOLUTION INTRAMUSCULAR; INTRAVENOUS at 15:11

## 2025-04-24 RX ADMIN — LIDOCAINE HYDROCHLORIDE 0.5 ML: 10 INJECTION, SOLUTION EPIDURAL; INFILTRATION; INTRACAUDAL; PERINEURAL at 11:45

## 2025-04-24 RX ADMIN — Medication 100 MCG: at 12:36

## 2025-04-24 RX ADMIN — ONDANSETRON 4 MG: 2 INJECTION INTRAMUSCULAR; INTRAVENOUS at 22:46

## 2025-04-24 RX ADMIN — BUPIVACAINE 8 ML: 13.3 INJECTION, SUSPENSION, LIPOSOMAL INFILTRATION at 11:52

## 2025-04-24 RX ADMIN — SODIUM CHLORIDE 2000 MG: 900 INJECTION INTRAVENOUS at 12:16

## 2025-04-24 RX ADMIN — TRANEXAMIC ACID 1000 MG: 10 INJECTION, SOLUTION INTRAVENOUS at 12:24

## 2025-04-24 RX ADMIN — DEXAMETHASONE SODIUM PHOSPHATE 4 MG: 4 INJECTION INTRA-ARTICULAR; INTRALESIONAL; INTRAMUSCULAR; INTRAVENOUS; SOFT TISSUE at 12:23

## 2025-04-24 RX ADMIN — LIDOCAINE HYDROCHLORIDE 50 MG: 10 INJECTION, SOLUTION EPIDURAL; INFILTRATION; INTRACAUDAL; PERINEURAL at 12:18

## 2025-04-24 RX ADMIN — FENTANYL CITRATE 50 MCG: 50 INJECTION, SOLUTION INTRAMUSCULAR; INTRAVENOUS at 14:31

## 2025-04-24 RX ADMIN — SODIUM CHLORIDE, POTASSIUM CHLORIDE, SODIUM LACTATE AND CALCIUM CHLORIDE 9 ML/HR: 600; 310; 30; 20 INJECTION, SOLUTION INTRAVENOUS at 11:45

## 2025-04-24 RX ADMIN — ROCURONIUM BROMIDE 50 MG: 10 INJECTION INTRAVENOUS at 12:18

## 2025-04-24 RX ADMIN — ONDANSETRON 4 MG: 2 INJECTION INTRAMUSCULAR; INTRAVENOUS at 17:38

## 2025-04-24 RX ADMIN — BUPIVACAINE HYDROCHLORIDE 10 ML: 5 INJECTION, SOLUTION EPIDURAL; INTRACAUDAL at 11:52

## 2025-04-24 RX ADMIN — Medication 100 MCG: at 12:43

## 2025-04-24 RX ADMIN — Medication 100 MCG: at 12:27

## 2025-04-24 RX ADMIN — SODIUM CHLORIDE 2000 MG: 900 INJECTION INTRAVENOUS at 20:24

## 2025-04-24 RX ADMIN — PROPOFOL 120 MG: 10 INJECTION, EMULSION INTRAVENOUS at 12:18

## 2025-04-24 RX ADMIN — LISINOPRIL 20 MG: 20 TABLET ORAL at 20:19

## 2025-04-24 RX ADMIN — SUGAMMADEX 150 MG: 100 INJECTION, SOLUTION INTRAVENOUS at 13:45

## 2025-04-24 RX ADMIN — DEXAMETHASONE SODIUM PHOSPHATE 2 MG: 10 INJECTION INTRAMUSCULAR; INTRAVENOUS at 12:00

## 2025-04-24 RX ADMIN — TRANEXAMIC ACID 1000 MG: 10 INJECTION, SOLUTION INTRAVENOUS at 13:17

## 2025-04-24 RX ADMIN — Medication 5 MG: at 13:49

## 2025-04-24 RX ADMIN — EMPAGLIFLOZIN 10 MG: 10 TABLET, FILM COATED ORAL at 18:19

## 2025-04-24 RX ADMIN — FENTANYL CITRATE 50 MCG: 50 INJECTION, SOLUTION INTRAMUSCULAR; INTRAVENOUS at 14:44

## 2025-04-24 RX ADMIN — FENTANYL CITRATE 50 MCG: 50 INJECTION, SOLUTION INTRAMUSCULAR; INTRAVENOUS at 11:52

## 2025-04-24 RX ADMIN — BUPIVACAINE HYDROCHLORIDE 25 ML: 2.5 INJECTION, SOLUTION EPIDURAL; INFILTRATION; INTRACAUDAL; PERINEURAL at 12:00

## 2025-04-24 RX ADMIN — Medication 100 MCG: at 12:18

## 2025-04-24 RX ADMIN — DROPERIDOL 0.62 MG: 2.5 INJECTION, SOLUTION INTRAMUSCULAR; INTRAVENOUS at 14:44

## 2025-04-24 NOTE — ANESTHESIA PROCEDURE NOTES
Airway  Reason: elective    Date/Time: 4/24/2025 12:20 PM  Airway not difficult    General Information and Staff    Patient location during procedure: OR  CRNA/CAA: Theodore Merino CRNA    Indications and Patient Condition  Indications for airway management: airway protection    Preoxygenated: yes  MILS not maintained throughout    Mask difficulty assessment: 1 - vent by mask    Final Airway Details    Final airway type: endotracheal airway      Successful airway: ETT  Cuffed: yes   Successful intubation technique: direct laryngoscopy  Adjuncts used in placement: intubating stylet  Endotracheal tube insertion site: oral  Blade: Patterson  Blade size: 2  ETT size (mm): 7.0  Cormack-Lehane Classification: grade I - full view of glottis  Placement verified by: chest auscultation and capnometry   Cuff volume (mL): 4  Measured from: lips  ETT/EBT  to lips (cm): 20  Number of attempts at approach: 1  Assessment: lips, teeth, and gum same as pre-op and atraumatic intubation    Additional Comments  Negative epigastric sounds, Breath sound equal bilaterally with symmetric chest rise and fall

## 2025-04-24 NOTE — H&P
Pre-Op H&P  Priscilla Purdy  [id number]  1945      Chief complaint: Right shoulder pain      Subjective:  Patient is a 80 y.o.female presents for scheduled surgery by Dr. Delcid. She anticipates a REVERSE TOTAL SHOULDER ARTHROPLASTY RIGHT  today.  She had a fall in 12/6/2024 and landed on her right shoulder.  Since then she has had pain and limited range of motion.  She reports some numbness in her right hand and difficulty with her .      Review of Systems:  Constitutional-- No fever, chills or sweats. No fatigue.  CV-- No chest pain, palpitation or syncope. +HTN, HLD, CHF  Resp-- No cough, hemoptysis. +SOB, COPD  Skin--No rashes or lesions      Allergies:   Allergies   Allergen Reactions    Morphine Hives    Nifedipine Rash and Headache     headache    Sulfa Antibiotics Hives         Home Meds:  Medications Prior to Admission   Medication Sig Dispense Refill Last Dose/Taking    Ascorbic Acid (Vitamin C) 500 MG capsule Take 1 tablet by mouth Daily.       benzoyl peroxide 5 % external wash Use as directed by Dr. Delcid 237 g 0     butalbital-acetaminophen-caffeine (FIORICET, ESGIC) -40 MG per tablet Take 1 tablet by mouth Every 6 (Six) Hours As Needed for Headache.       Cholecalciferol (Vitamin D3) 25 MCG (1000 UT) capsule Take 1 capsule by mouth Daily.       clopidogrel (PLAVIX) 75 MG tablet Take 1 tablet by mouth Daily. Dr. Delcid office to contact patient regarding holding instructions       coenzyme Q10 100 MG capsule Take 1 capsule by mouth Daily.       dicyclomine (BENTYL) 10 MG capsule Take 1 capsule by mouth As Needed for Abdominal Cramping.       empagliflozin (Jardiance) 10 MG tablet tablet Take 1 tablet by mouth Daily.       Enoxaparin Sodium (LOVENOX) 40 MG/0.4ML solution prefilled syringe syringe Inject 0.4 mL under the skin into the appropriate area as directed. (Patient not taking: Reported on 3/18/2025)       estrogens, conjugated, (PREMARIN) 0.625 MG tablet Take 1 tablet by  mouth Daily.       Flaxseed, Linseed, (FLAXSEED OIL PO) Take 1 tablet by mouth Daily.       fluticasone (FLONASE) 50 MCG/ACT nasal spray Administer 2 sprays into the nostril(s) as directed by provider Daily.       furosemide (LASIX) 20 MG tablet Take 0.5 tablets by mouth As Needed (weight gain of 3 lbs over 3 days or shortness of breath).       Lecithin 1200 MG capsule Take 1 tablet by mouth Daily.       levothyroxine (SYNTHROID, LEVOTHROID) 88 MCG tablet Take 1 tablet by mouth Daily.       lidocaine (LIDODERM) 5 % Place 1 patch on the skin as directed by provider Daily. Remove & Discard patch within 12 hours or as directed by MD 30 patch 1     lisinopril (PRINIVIL,ZESTRIL) 20 MG tablet Take 1 tablet by mouth 2 (Two) Times a Day.       Misc Natural Products (BEET ROOT PO) Take 1 tablet/day by mouth Daily.       mupirocin (BACTROBAN) 2 % ointment Administer into each nostril as directed by provider 2 (Two) Times a Day beginning 5 days before surgery 22 g 0     nitroglycerin (NITROSTAT) 0.4 MG SL tablet Place 1 tablet under the tongue Every 5 (Five) Minutes As Needed for Chest Pain.       NON FORMULARY Take 1 tablet by mouth Daily. Hair, skin and nails       ondansetron (ZOFRAN) 4 MG tablet Take 1 tablet by mouth Every 8 (Eight) Hours As Needed for post op nausea (Patient not taking: Reported on 4/10/2025) 30 tablet 0     pantoprazole (PROTONIX) 40 MG EC tablet Take 1 tablet by mouth Daily.       Repatha SureClick solution auto-injector SureClick injection Inject 1 mL under the skin into the appropriate area as directed Every 14 (Fourteen) Days. 3/30/25 last dose       rosuvastatin (CRESTOR) 5 MG tablet Take 1 tablet by mouth 3 (Three) Times a Week.       sertraline (ZOLOFT) 25 MG tablet Take 0.5 tablets by mouth Daily.       tiotropium bromide-olodaterol (STIOLTO RESPIMAT) 2.5-2.5 MCG/ACT aerosol solution inhaler Inhale 2 puffs Daily. 3 each 2     Toprol XL 50 MG 24 hr tablet Take 1 tablet by mouth Daily.        vitamin B-12 (CYANOCOBALAMIN) 1000 MCG tablet Take 1 tablet by mouth Daily.       VITAMIN E 400 UNIT capsule Take 1 capsule by mouth Daily.       warfarin (COUMADIN) 2 MG tablet Take 1 tablet by mouth As Needed.       warfarin (Coumadin) 4 MG tablet Take 1 tablet by mouth 3 (Three) Times a Week if Needed.       warfarin (COUMADIN) 4 MG tablet Take 1 tablet by mouth Daily. Dr. Delcid office prior holding instructions prior to surgery            PMH:   Past Medical History:   Diagnosis Date    Anticardiolipin syndrome     CHF (congestive heart failure)     Cochlear implant in place     right    COPD (chronic obstructive pulmonary disease)     Coronary artery disease     MAGDIEL x2 (), MAGDIEL x3 ()    Disease of thyroid gland     hypothyroid    DVT (deep venous thrombosis)     Hyperlipidemia     Hypertension     MI (myocardial infarction)     Raynaud's disease      PSH:    Past Surgical History:   Procedure Laterality Date    CHOLECYSTECTOMY      COLONOSCOPY      CORONARY ANGIOPLASTY WITH STENT PLACEMENT      MAGDIEL x 2    CORONARY ANGIOPLASTY WITH STENT PLACEMENT      MAGDIEL x3    EAR MASTOIDECTOMY W/ COCHLEAR IMPLANT W/ LANDMARK Right     ENDOMETRIAL ABLATION      LEG SURGERY Right     x2    OVARIAN CYST REMOVAL      TUBAL ABDOMINAL LIGATION         Immunization History:  Influenza: No  Pneumococcal: UTD  Tetanus: Unknown    Social History:   Tobacco:   Social History     Tobacco Use   Smoking Status Former    Current packs/day: 0.00    Average packs/day: 1 pack/day for 21.0 years (21.0 ttl pk-yrs)    Types: Cigarettes    Start date:     Quit date: 1981    Years since quittin.3    Passive exposure: Never   Smokeless Tobacco Never      Alcohol:     Social History     Substance and Sexual Activity   Alcohol Use Yes    Comment: occasional         Physical Exam:There were no vitals taken for this visit.      General Appearance:    Alert, cooperative, no distress, appears stated age   Head:     Normocephalic, without obvious abnormality, atraumatic   Lungs:     Clear to auscultation bilaterally, respirations unlabored    Heart:   Regular rate and rhythm, S1 and S2 normal    Abdomen:    Soft without tenderness   Extremities:   Extremities normal, atraumatic, no cyanosis or edema   Skin:   Skin color, texture, turgor normal, no rashes or lesions   Neurologic:   Grossly intact     Results Review:     LABS:  Lab Results   Component Value Date    WBC 5.01 04/10/2025    HGB 12.8 04/10/2025    HCT 40.6 04/10/2025    MCV 98.8 (H) 04/10/2025     04/10/2025    NEUTROABS 2.86 04/10/2025    GLUCOSE 92 04/10/2025    BUN 22 04/10/2025    CREATININE 1.05 (H) 04/10/2025     04/10/2025    K 4.5 04/10/2025     (H) 04/10/2025    CO2 22.0 04/10/2025    MG 1.9 02/20/2024    CALCIUM 8.8 04/10/2025    ALBUMIN 4.0 04/10/2025    AST 21 04/10/2025    ALT 9 04/10/2025    BILITOT 0.3 04/10/2025       RADIOLOGY:    Study Result    Narrative & Impression   RIGHT SHOULDER MRI     HISTORY: Shoulder pain.     FINDINGS: On the coronal images, there is complete disruption and  proximal retraction of the supraspinatus tendon. Tendinous retraction  measures approximately 4.4 cm. There is superior subluxation of the  humeral head relative to the bony glenoid.     On the axial images, degenerative cyst formation is identified within  the superior bony glenoid. Anterior and posterior glenoid leroy appear  intact. Subscapularis tendon and biceps tendon appear intact.     Moderate hypertrophic changes of osteoarthritis are noted at the  acromioclavicular joint.     IMPRESSION:  1. Complete disruption of proximal retraction of supraspinatus tendon,  as described.  2. Moderate hypertrophic changes of osteoarthritis at the  acromioclavicular joint.  3. Degenerative subchondral cyst formation in the superior bony glenoid.       I reviewed the patient's new clinical results.    Cancer Staging (if applicable)  Cancer Patient: __ yes  __no __unknown; If yes, clinical stage T:__ N:__M:__, stage group or __N/A      Impression: Right shoulder pain      Plan: REVERSE TOTAL SHOULDER ARTHROPLASTY RIGHT       CIPRIANO Mejía   4/24/2025   11:18 EDT

## 2025-04-24 NOTE — PROGRESS NOTES
Pharmacy Consult - Warfarin Dosing    Priscilla Purdy is a 80 y.o. female receiving warfarin therapy.    Consulting Provider: Dr. Drummond  Indication: History of DVT  Goal INR: 2 - 3  Home Regimen: Per patient, two 2 mg tablets daily. Based on [facility] history, she got warfarin 2 mg tabs x 360 filled for a 90 day supply on 4/16. Patient also has script for 1 mg tablets filled in her [facility] history. Confirmed with patient's RN that she was prescribed the 1 mg tablets to be taken on an as needed basis if INR was subtherapeutic. I asked the nurse to assess patient's healthcare literacy, to which she responded the patient seems confident this is the dose she takes.     Bridge Therapy: No    Drug-Drug Interactions with current regimen:  Fioricet and warfarin (Category D interaction) --> Barbiturates may increase the metabolism of Vitamin K Antagonists. Patient has been taking both at home so this interaction does not greatly concern me.     Warfarin Dosing During Admission:    Date  4/24           INR  0.97           Dose  --                Discharge Follow-Up:     Outpatient Following Provider - Dr. East     Follow-Up Recommendation - N/A    Labs:  Results from last 7 days   Lab Units 04/24/25  1125   INR  0.97     Results from last 7 days   Lab Units 04/24/25  1125   POTASSIUM mmol/L 3.9     Current dietary intake:     Diet: Diabetic; Consistent Carbohydrate; Fluid Consistency: Thin          Assessment/Plan:  Patient's INR is subtherapeutic at 0.97 today.  Give warfarin 6 mg tonight and dose based on INR level tomorrow.   Daily PT/INR ordered.  Monitor signs/symptoms of bleeding, dietary intake, and drug-drug interactions. Make dose adjustments as necessary.  Pharmacy will continue to follow.       Aleksandar Guillermo RPH  4/24/2025  17:48 EDT

## 2025-04-24 NOTE — H&P
Patient Name: Priscilla Purdy  MRN: [id number]  : 1945  DOS: 2025    Attending: Norbert Delcid MD    Primary Care Provider: Teddy East MD      Chief complaint: Right shoulder pain    Subjective   Patient is a pleasant 80 y.o. female presented for scheduled surgery by Dr. Delcid.     She had a fall in 2024 and landed on her right shoulder.  Since then she has had pain and limited range of motion.  She reports some numbness in her right hand and difficulty with her .     She underwent right reverse total shoulder arthroplasty under GA and a block, tolerated surgery well, and was admitted for further management.    Seen in her room postop, doing fairly well, no complaints of nausea, vomiting, or shortness of breath.    Reviewed with patient and her  her past medical history including hypertension, diabetes, COPD, congestive heart failure, coronary artery disease with prior stents, history of DVT and chronic anticoagulation with history of anticardiolipin syndrome.    Allergies   Allergen Reactions    Morphine Hives    Nifedipine Rash and Headache     headache    Sulfa Antibiotics Hives       Meds:  Medications Prior to Admission   Medication Sig Dispense Refill Last Dose/Taking    Ascorbic Acid (Vitamin C) 500 MG capsule Take 1 tablet by mouth Daily.   2025    benzoyl peroxide 5 % external wash Use as directed by Dr. Delcid 237 g 0 2025    butalbital-acetaminophen-caffeine (FIORICET, ESGIC) -40 MG per tablet Take 1 tablet by mouth Every 6 (Six) Hours As Needed for Headache.   2025    Cholecalciferol (Vitamin D3) 25 MCG (1000 UT) capsule Take 1 capsule by mouth Daily.   2025    coenzyme Q10 100 MG capsule Take 1 capsule by mouth Daily.   2025    dicyclomine (BENTYL) 10 MG capsule Take 1 capsule by mouth As Needed for Abdominal Cramping.   2025    empagliflozin (Jardiance) 10 MG tablet tablet Take 1 tablet by mouth Daily.    4/23/2025    Enoxaparin Sodium (LOVENOX) 40 MG/0.4ML solution prefilled syringe syringe Inject 0.4 mL under the skin into the appropriate area as directed.   4/23/2025    estrogens, conjugated, (PREMARIN) 0.625 MG tablet Take 1 tablet by mouth Daily.   4/23/2025    Flaxseed, Linseed, (FLAXSEED OIL PO) Take 1 tablet by mouth Daily.   4/23/2025    fluticasone (FLONASE) 50 MCG/ACT nasal spray Administer 2 sprays into the nostril(s) as directed by provider Daily.   4/23/2025    furosemide (LASIX) 20 MG tablet Take 0.5 tablets by mouth As Needed (weight gain of 3 lbs over 3 days or shortness of breath).   4/23/2025    Lecithin 1200 MG capsule Take 1 tablet by mouth Daily.   4/23/2025    levothyroxine (SYNTHROID, LEVOTHROID) 88 MCG tablet Take 1 tablet by mouth Daily.   4/24/2025    lidocaine (LIDODERM) 5 % Place 1 patch on the skin as directed by provider Daily. Remove & Discard patch within 12 hours or as directed by MD 30 patch 1 4/23/2025    lisinopril (PRINIVIL,ZESTRIL) 20 MG tablet Take 1 tablet by mouth 2 (Two) Times a Day.   4/23/2025    Misc Natural Products (BEET ROOT PO) Take 1 tablet/day by mouth Daily.   4/23/2025    mupirocin (BACTROBAN) 2 % ointment Administer into each nostril as directed by provider 2 (Two) Times a Day beginning 5 days before surgery 22 g 0 4/23/2025    nitroglycerin (NITROSTAT) 0.4 MG SL tablet Place 1 tablet under the tongue Every 5 (Five) Minutes As Needed for Chest Pain.   Past Week    NON FORMULARY Take 1 tablet by mouth Daily. Hair, skin and nails   4/23/2025    ondansetron (ZOFRAN) 4 MG tablet Take 1 tablet by mouth Every 8 (Eight) Hours As Needed for post op nausea 30 tablet 0 Past Week    pantoprazole (PROTONIX) 40 MG EC tablet Take 1 tablet by mouth Daily.   4/23/2025    rosuvastatin (CRESTOR) 5 MG tablet Take 1 tablet by mouth 3 (Three) Times a Week.   4/23/2025    sertraline (ZOLOFT) 25 MG tablet Take 0.5 tablets by mouth Daily.   4/23/2025    tiotropium bromide-olodaterol  (STIOLTO RESPIMAT) 2.5-2.5 MCG/ACT aerosol solution inhaler Inhale 2 puffs Daily. 3 each 2 4/23/2025    Toprol XL 50 MG 24 hr tablet Take 1 tablet by mouth Daily.   4/23/2025    vitamin B-12 (CYANOCOBALAMIN) 1000 MCG tablet Take 1 tablet by mouth Daily.   4/23/2025    VITAMIN E 400 UNIT capsule Take 1 capsule by mouth Daily.   4/23/2025    clopidogrel (PLAVIX) 75 MG tablet Take 1 tablet by mouth Daily. Dr. Delcid office to contact patient regarding holding instructions   4/21/2025    Repatha SureClick solution auto-injector SureClick injection Inject 1 mL under the skin into the appropriate area as directed Every 14 (Fourteen) Days. 3/30/25 last dose       warfarin (COUMADIN) 2 MG tablet Take 1 tablet by mouth As Needed.       warfarin (Coumadin) 4 MG tablet Take 1 tablet by mouth 3 (Three) Times a Week if Needed.   4/17/2025    warfarin (COUMADIN) 4 MG tablet Take 1 tablet by mouth Daily. Dr. Delcid office prior holding instructions prior to surgery          Past Medical History:   Diagnosis Date    Anticardiolipin syndrome     CHF (congestive heart failure)     Cochlear implant in place     right    COPD (chronic obstructive pulmonary disease)     Coronary artery disease     MAGDIEL x2 (2008), MAGDIEL x3 (2024)    Disease of thyroid gland     hypothyroid    DVT (deep venous thrombosis)     Hyperlipidemia     Hypertension     MI (myocardial infarction)     Raynaud's disease      Past Surgical History:   Procedure Laterality Date    CHOLECYSTECTOMY      COLONOSCOPY      CORONARY ANGIOPLASTY WITH STENT PLACEMENT  2008    MAGDIEL x 2    CORONARY ANGIOPLASTY WITH STENT PLACEMENT  2024    MAGDIEL x3    EAR MASTOIDECTOMY W/ COCHLEAR IMPLANT W/ LANDMARK Right     ENDOMETRIAL ABLATION      LEG SURGERY Right     x2    OVARIAN CYST REMOVAL      TUBAL ABDOMINAL LIGATION       History reviewed. No pertinent family history.  Social History     Tobacco Use    Smoking status: Former     Current packs/day: 0.00     Average packs/day: 1 pack/day  "for 21.0 years (21.0 ttl pk-yrs)     Types: Cigarettes     Start date:      Quit date: 1981     Years since quittin.3     Passive exposure: Never    Smokeless tobacco: Never   Vaping Use    Vaping status: Never Used   Substance Use Topics    Alcohol use: Yes     Comment: occasional    Drug use: Never       Review of Systems  Pertinent items are noted in HPI    Vital Signs  /61 (BP Location: Left arm, Patient Position: Lying)   Pulse 72   Temp 97.5 °F (36.4 °C) (Oral)   Resp 18   Ht 162.6 cm (64\")   Wt 57.6 kg (127 lb)   SpO2 92%   BMI 21.80 kg/m²     Physical Exam:    General Appearance:    Alert, cooperative, in no acute distress   Head:    Normocephalic, without obvious abnormality, atraumatic   Eyes:            Lids and lashes normal, conjunctivae and sclerae normal, no   icterus, no pallor, corneas clear,    Ears:    Ears appear intact with no abnormalities noted   Throat:   No oral lesions, no thrush, oral mucosa moist   Neck:   No adenopathy, supple, trachea midline, no thyromegaly         Lungs:     Clear to auscultation,respirations regular, even and                   unlabored    Heart:    Regular rhythm and normal rate, normal S1 and S2, no      murmur    Abdomen:     Normal bowel sounds, no masses, no organomegaly, soft        nontender, nondistended, no guarding, no rebound                 tenderness   Genitalia:    Deferred   Extremities: Right UE in a sling, CDI  dressing . Interscalene nerve block cath present.  Distal pulses, cap refill, movements of fingers, wrist, intact.     Pulses:   Pulses palpable and equal bilaterally   Skin:   No bleeding, bruising or rash   Neurologic:   Cranial nerves 2 - 12 grossly intact      I reviewed the patient's new clinical results.             Invalid input(s): \"NEUTOPHILPCT\"  Results from last 7 days   Lab Units 25  1125   POTASSIUM mmol/L 3.9     Lab Results   Component Value Date    HGBA1C 5.50 04/10/2025      Latest " Reference Range & Units 04/10/25 10:21   Sodium 136 - 145 mmol/L 139   Potassium 3.5 - 5.2 mmol/L 4.5   Chloride 98 - 107 mmol/L 109 (H)   CO2 22.0 - 29.0 mmol/L 22.0   Anion Gap 5.0 - 15.0 mmol/L 8.0   BUN 8 - 23 mg/dL 22   Creatinine 0.57 - 1.00 mg/dL 1.05 (H)   BUN/Creatinine Ratio 7.0 - 25.0  21.0   eGFR >60.0 mL/min/1.73 53.5 (L)   Glucose 65 - 99 mg/dL 92   Calcium 8.6 - 10.5 mg/dL 8.8   Alkaline Phosphatase 39 - 117 U/L 79   Total Protein 6.0 - 8.5 g/dL 6.9   Albumin 3.5 - 5.2 g/dL 4.0   Globulin gm/dL 2.9   A/G Ratio g/dL 1.4   AST (SGOT) 1 - 32 U/L 21   ALT (SGPT) 1 - 33 U/L 9   Total Bilirubin 0.0 - 1.2 mg/dL 0.3   (H): Data is abnormally high  (L): Data is abnormally low     Latest Reference Range & Units 04/10/25 10:21   WBC 3.40 - 10.80 10*3/mm3 5.01   RBC 3.77 - 5.28 10*6/mm3 4.11   Hemoglobin 12.0 - 15.9 g/dL 12.8   Hematocrit 34.0 - 46.6 % 40.6   Platelets 140 - 450 10*3/mm3 196   RDW 12.3 - 15.4 % 13.7   MCV 79.0 - 97.0 fL 98.8 (H)   MCH 26.6 - 33.0 pg 31.1   MCHC 31.5 - 35.7 g/dL 31.5   MPV 6.0 - 12.0 fL 10.9   RDW-SD 37.0 - 54.0 fl 50.0   (H): Data is abnormally high    Assessment and Plan:       Status post reverse total replacement of right shoulder    Rotator cuff arthropathy of right shoulder    Rotator cuff arthropathy    HTN (hypertension)    Hyperlipidemia    COPD (chronic obstructive pulmonary disease)    CAD (coronary artery disease), s/p stents    Anticardiolipin syndrome      Plan    1. PT/OT. NWB, right UE, ROM hand, wrist, elbow.  2. Pain control-prns, interscalene nerve block cath with ropivacaine infusion.   3. IS-encourage  4. DVT proph- Mech/ mobilize.  5. Bowel regimen  6. Resume home medications as appropriate  7. Monitor post-op labs  8. DC planning     - Hypertension:  Resume home medications as appropriate, formulary substitution when indicated.  Holding parameters.  PRN medications for elevated blood pressure.    - History of DVT, anticardiolipin syndrome, chronic  anticoagulation:  Resume warfarin tonight.   Lovenox start tomorrow      -Coronary artery disease with prior stents: Resume cardiac regimen.  Plavix will resume on postop day 2 if okay with orthopedic surgery.    -COPD: Resume home regimen with formulary substitution as indicated.  As needed bronchodilators.  Oxygen supplement as needed.    Dragon disclaimer:  Part of this encounter note is an electronic transcription/translation of spoken language to printed text. The electronic translation of spoken language may permit erroneous, or at times, nonsensical words or phrases to be inadvertently transcribed; Although I have reviewed the note for such errors, some may still exist.    Alvaro Drummond MD  04/24/25  16:47 EDT

## 2025-04-24 NOTE — PLAN OF CARE
Problem: Adult Inpatient Plan of Care  Goal: Plan of Care Review  Outcome: Progressing  Flowsheets  Taken 4/24/2025 1811 by Mili Paniagua RN  Progress: improving  Taken 4/24/2025 1129 by Jonah Butler RN  Plan of Care Reviewed With:   spouse   patient  Goal: Patient-Specific Goal (Individualized)  Outcome: Progressing  Goal: Absence of Hospital-Acquired Illness or Injury  Outcome: Progressing  Intervention: Identify and Manage Fall Risk  Recent Flowsheet Documentation  Taken 4/24/2025 1800 by Mili Paniagua RN  Safety Promotion/Fall Prevention:   activity supervised   assistive device/personal items within reach   clutter free environment maintained   room organization consistent   safety round/check completed   toileting scheduled  Taken 4/24/2025 1552 by Mili Paniagua RN  Safety Promotion/Fall Prevention:   activity supervised   assistive device/personal items within reach   clutter free environment maintained   room organization consistent   safety round/check completed   toileting scheduled  Intervention: Prevent Skin Injury  Recent Flowsheet Documentation  Taken 4/24/2025 1800 by Mili Paniagua RN  Body Position: position changed independently  Taken 4/24/2025 1552 by Mili Paniagua RN  Body Position: sitting up in bed  Intervention: Prevent and Manage VTE (Venous Thromboembolism) Risk  Recent Flowsheet Documentation  Taken 4/24/2025 1800 by Mili Paniagua RN  VTE Prevention/Management:   bilateral   SCDs (sequential compression devices) on  Taken 4/24/2025 1552 by Mili Paniagua RN  VTE Prevention/Management:   bilateral   SCDs (sequential compression devices) on  Intervention: Prevent Infection  Recent Flowsheet Documentation  Taken 4/24/2025 1800 by Mili Paniagua RN  Infection Prevention:   environmental surveillance performed   rest/sleep promoted  Taken 4/24/2025 1552 by Mili Paniagua RN  Infection Prevention:   environmental surveillance performed   rest/sleep promoted  Goal: Optimal  Comfort and Wellbeing  Outcome: Progressing  Intervention: Provide Person-Centered Care  Recent Flowsheet Documentation  Taken 4/24/2025 1552 by Mili Paniagua RN  Trust Relationship/Rapport: care explained  Goal: Readiness for Transition of Care  Outcome: Progressing  Intervention: Mutually Develop Transition Plan  Recent Flowsheet Documentation  Taken 4/24/2025 1538 by Mili Paniagua RN  Transportation Anticipated: family or friend will provide  Transportation Concerns: none  Patient/Family Anticipated Services at Transition:   Patient/Family Anticipates Transition to: home with family     Problem: Comorbidity Management  Goal: Blood Pressure in Desired Range  Outcome: Progressing  Intervention: Maintain Blood Pressure Management  Recent Flowsheet Documentation  Taken 4/24/2025 1800 by Mili Paniagua RN  Medication Review/Management: medications reviewed  Taken 4/24/2025 1552 by Mili Paniagua RN  Medication Review/Management: medications reviewed     Problem: Skin Injury Risk Increased  Goal: Skin Health and Integrity  Outcome: Progressing  Intervention: Optimize Skin Protection  Recent Flowsheet Documentation  Taken 4/24/2025 1800 by Mili Paniagua RN  Activity Management: activity encouraged  Head of Bed (HOB) Positioning: HOB at 30-45 degrees  Taken 4/24/2025 1552 by Mili Paniagua RN  Activity Management:   activity encouraged   ambulated in room  Head of Bed (HOB) Positioning: HOB at 30-45 degrees     Problem: Shoulder Arthroplasty (Total, Geovanny, Reverse)  Goal: Optimal Coping  Outcome: Progressing  Goal: Absence of Bleeding  Outcome: Progressing  Goal: Effective Bowel Elimination  Outcome: Progressing  Goal: Fluid and Electrolyte Balance  Outcome: Progressing  Goal: Optimal Functional Ability  Outcome: Progressing  Intervention: Promote Optimal Functional Status  Recent Flowsheet Documentation  Taken 4/24/2025 1800 by Mili Paniagua RN  Activity Management: activity encouraged  Taken  4/24/2025 1552 by Mili Paniagua RN  Activity Management:   activity encouraged   ambulated in room  Assistive Device Utilized: gait belt  Goal: Absence of Infection Signs and Symptoms  Outcome: Progressing  Goal: Intact Neurovascular Status  Outcome: Progressing  Goal: Anesthesia/Sedation Recovery  Outcome: Progressing  Intervention: Optimize Anesthesia Recovery  Recent Flowsheet Documentation  Taken 4/24/2025 1800 by Mili Paniagua RN  Safety Promotion/Fall Prevention:   activity supervised   assistive device/personal items within reach   clutter free environment maintained   room organization consistent   safety round/check completed   toileting scheduled  Administration (IS): self-administered  Taken 4/24/2025 1552 by Mili Paniagua RN  Safety Promotion/Fall Prevention:   activity supervised   assistive device/personal items within reach   clutter free environment maintained   room organization consistent   safety round/check completed   toileting scheduled  Administration (IS):   instruction provided, initial   self-administered   proper technique demonstrated  Goal: Optimal Pain Control and Function  Outcome: Progressing  Goal: Nausea and Vomiting Relief  Outcome: Progressing  Goal: Effective Urinary Elimination  Outcome: Progressing   Goal Outcome Evaluation:           Progress: improving        Pt alert and oriented x4. VSS. 2L O2. Dressing CDI. Sling in place. Pt reported some numbness in RUE,  weak. Pt planning on going home tomorrow

## 2025-04-24 NOTE — PLAN OF CARE
Goal Outcome Evaluation:  Plan of Care Reviewed With: patient, spouse           Outcome Evaluation: OT educated pt and family on shoulder precautions, ADL retraining to maintain, sling management and HEP. Pt declined ambulation d/t fatigue and nausea, unable to complete mobility screen. Recommend PT eval d/t recent fall h/o. Pt lives with spouse who can assist as needed. Recommend DC home with initial 24/7 assist pending succesful completion of mobility with PT.    Anticipated Discharge Disposition (OT): home with 24/7 care

## 2025-04-24 NOTE — ANESTHESIA PROCEDURE NOTES
Right ISC      Patient reassessed immediately prior to procedure    Patient location during procedure: pre-op  Start time: 4/24/2025 11:52 AM  Reason for block: at surgeon's request and post-op pain management  Performed by  CRNA/CAA: Mich Simpson CRNA  Assisted by: Monika Stapleton RN  Preanesthetic Checklist  Completed: patient identified, IV checked, site marked, risks and benefits discussed, surgical consent, monitors and equipment checked, pre-op evaluation and timeout performed  Prep:  Pt Position: left lateral decubitus  Sterile barriers:cap, gloves, mask and washed/disinfected hands  Prep: ChloraPrep  Patient monitoring: blood pressure monitoring, continuous pulse oximetry and EKG  Procedure    Sedation: yes  Performed under: local infiltration  Guidance:ultrasound guided    ULTRASOUND INTERPRETATION.  Using ultrasound guidance a 20 G gauge needle was placed in close proximity to the brachial plexus nerve, at which point, under ultrasound guidance anesthetic was injected in the area of the nerve and spread of the anesthesia was seen on ultrasound in close proximity thereto.  There were no abnormalities seen on ultrasound; a digital image was taken; and the patient tolerated the procedure with no complications. Images:still images obtained, printed/placed on chart    Laterality:right  Block Type:interscalene  Injection Technique:single-shot  Needle Type:Tuohy and echogenic  Needle Gauge:18 G  Resistance on Injection: none  Catheter Size:20 G (20g)    Medications Used: bupivacaine (PF) (MARCAINE) 0.5 % injection - Injection   10 mL - 4/24/2025 11:52:00 AM  fentaNYL citrate (PF) (SUBLIMAZE) injection - Intravenous   50 mcg - 4/24/2025 11:52:00 AM      Medications  Preservative Free Saline:5ml    Post Assessment  Injection Assessment: negative aspiration for heme, no paresthesia on injection and incremental injection  Patient Tolerance:comfortable throughout block  Complications:no  Additional  "Notes  CATHETER  A high-frequency linear transducer, with sterile cover, was placed in the supraclavicular fossa to identify the subclavian artery and trunks and divisions of the brachial plexus. The transducer was then moved in a cephalad orientation with a slight rotation to continue visualization of the brachial plexus from the trunks and divisions, on to the C5-C7 roots. The insertion site was prepped and draped in sterile fashion. Skin and cutaneous tissue was infiltrated with 2-5 ml of 1% Lidocaine. Using ultrasound-guidance, an 18-gauge Contiplex Ultra 360 Touhy needle was advanced in plane from lateral to medial. Preservative-free normal saline was utilized for hydro-dissection of tissue, advancement of Touhy, and to confirm final needle placement at the fascial plane between the middle scalene muscle and sheath of the brachial plexus (C5-C7). A 20-gauge Contiplex Echo catheter was placed through the needle and advance out the tip of the Touhy 3-5 cm with the \"Barrientos Flip\". The Touhy needle was then removed, and final catheter position verified lateral to the brachial plexus with local anesthetic (LA) and ultrasound visualization. The catheter was secured in the usual fashion with skin glue, benzoin, steri-strips, CHG tegaderm and label noting \"Nerve Block Catheter\". Jerk tape applied at yellow connector and catheter connection. All LA was injected in increments of 3-5 ml after catheter secured. Aspiration every 5 ml to prevent intravascular injection. Injection was completed with negative aspiration of blood and negative intravascular injection. Injection pressures were normal with minimal resistance.   Performed by: Mich Simpson, CRNA          "

## 2025-04-24 NOTE — ANESTHESIA POSTPROCEDURE EVALUATION
Patient: Priscilla Purdy    Procedure Summary       Date: 04/24/25 Room / Location:  [facility]  /  [facility]    Anesthesia Start: 1213 Anesthesia Stop: 1407    Procedure: REVERSE TOTAL SHOULDER ARTHROPLASTY RIGHT (Right: Shoulder) Diagnosis:       Rotator cuff tear arthropathy      (right rotator cuff tear)    Surgeons: Norbert Delcid MD Provider: Randee Kurtz MD    Anesthesia Type: general with block ASA Status: 3            Anesthesia Type: general with block    Vitals  No vitals data found for the desired time range.          Post Anesthesia Care and Evaluation    Patient location during evaluation: PACU  Patient participation: complete - patient participated  Level of consciousness: awake and alert  Pain management: adequate    Airway patency: patent  Anesthetic complications: No anesthetic complications  PONV Status: none  Cardiovascular status: hemodynamically stable and acceptable  Respiratory status: nonlabored ventilation, acceptable and nasal cannula  Hydration status: acceptable

## 2025-04-24 NOTE — ANESTHESIA PREPROCEDURE EVALUATION
Anesthesia Evaluation     Patient summary reviewed and Nursing notes reviewed   no history of anesthetic complications:   NPO Solid Status: > 8 hours  NPO Liquid Status: > 8 hours           Airway   Mallampati: II  TM distance: >3 FB  Neck ROM: full  Dental - normal exam     Pulmonary - normal exam   (+) a smoker Former, COPD,  Cardiovascular - normal exam  Exercise tolerance: good (4-7 METS)    ECG reviewed    (+) hypertension, past MI  >12 months, CAD, cardiac stents Drug eluting stent more than 12 months ago , CHF , DVT, hyperlipidemia  (-) angina    ROS comment: 3/11/24  EF 61%  No significant valvular disease    Cardiac clearance on chart; moderate risk for sx/anesthesia     Neuro/Psych  (-) seizures, TIA, CVA  GI/Hepatic/Renal/Endo    (+) thyroid problem hypothyroidism  (-) GERD, no renal disease, diabetes    Musculoskeletal     Abdominal    Substance History      OB/GYN          Other        ROS/Med Hx Other: Anticardiolipin syndrome  Cochlear implant  Pt bridged with lovenox; last lovenox 4/23/25 22:00              Anesthesia Plan    ASA 3     general with block     intravenous induction     Anesthetic plan, risks, benefits, and alternatives have been provided, discussed and informed consent has been obtained with: patient.    Plan discussed with CRNA.    CODE STATUS:

## 2025-04-24 NOTE — THERAPY EVALUATION
Patient Name: Priscilla Purdy  : 1945    MRN: [id number]                              Today's Date: 2025       Admit Date: 2025    Visit Dx: No diagnosis found.  Patient Active Problem List   Diagnosis    Pain in thoracic spine    Osteoarthritis of thoracolumbar spine    Rotator cuff arthropathy of right shoulder    Rotator cuff arthropathy    HTN (hypertension)    Hyperlipidemia    COPD (chronic obstructive pulmonary disease)    CAD (coronary artery disease), s/p stents    Status post reverse total replacement of right shoulder    Anticardiolipin syndrome     Past Medical History:   Diagnosis Date    Anticardiolipin syndrome     CHF (congestive heart failure)     Cochlear implant in place     right    COPD (chronic obstructive pulmonary disease)     Coronary artery disease     AMGDIEL x2 (), MAGDIEL x3 ()    Disease of thyroid gland     hypothyroid    DVT (deep venous thrombosis)     Hyperlipidemia     Hypertension     MI (myocardial infarction)     Raynaud's disease      Past Surgical History:   Procedure Laterality Date    CHOLECYSTECTOMY      COLONOSCOPY      CORONARY ANGIOPLASTY WITH STENT PLACEMENT      MAGDIEL x 2    CORONARY ANGIOPLASTY WITH STENT PLACEMENT      MAGDIEL x3    EAR MASTOIDECTOMY W/ COCHLEAR IMPLANT W/ LANDMARK Right     ENDOMETRIAL ABLATION      LEG SURGERY Right     x2    OVARIAN CYST REMOVAL      TUBAL ABDOMINAL LIGATION        General Information       Row Name 25 1757          OT Time and Intention    Document Type evaluation  -AR     Mode of Treatment occupational therapy;individual therapy  -AR       Row Name 25 1295          General Information    Patient Profile Reviewed yes  -AR     Prior Level of Function independent:;all household mobility;community mobility;gait;transfer;ADL's  -AR     Existing Precautions/Restrictions fall;non-weight bearing;shoulder;right  Donjoy II with pillow  -AR     Barriers to Rehab none identified  -AR       Row Name 25  1755          Living Environment    Current Living Arrangements home  -AR     People in Home spouse  -AR       Row Name 04/24/25 1755          Home Main Entrance    Number of Stairs, Main Entrance one  -AR     Stair Railings, Main Entrance none  -AR       Row Name 04/24/25 1755          Stairs Within Home, Primary    Number of Stairs, Within Home, Primary none  -AR       Row Name 04/24/25 1755          Cognition    Orientation Status (Cognition) oriented x 4  -AR       Row Name 04/24/25 1755          Safety Issues/Impairments Affecting Functional Mobility    Safety Issues Affecting Function (Mobility) safety precautions follow-through/compliance;safety precaution awareness  -AR     Impairments Affecting Function (Mobility) endurance/activity tolerance;motor control;range of motion (ROM);sensation/sensory awareness;strength  -AR               User Key  (r) = Recorded By, (t) = Taken By, (c) = Cosigned By      Initials Name Provider Type    AR Helen Royal OT Occupational Therapist                     Mobility/ADL's       Row Name 04/24/25 1757          Bed Mobility    Bed Mobility scooting/bridging;supine-sit;sit-supine  -AR     Scooting/Bridging [place] (Bed Mobility) moderate assist (50% patient effort);2 person assist  -AR     Supine-Sit [place] (Bed Mobility) verbal cues;contact guard  -AR     Sit-Supine [place] (Bed Mobility) contact guard;verbal cues  -AR     Assistive Device (Bed Mobility) head of bed elevated  -AR     Comment, (Bed Mobility) OT educated pt and spouse on importance of maintaining NWB and safe sleeping position.  -AR       Row Name 04/24/25 1757          Transfers    Comment, (Transfers) pt declined c/o fatigue and nause  -AR       Row Name 04/24/25 1757          Activities of Daily Living    BADL Assessment/Intervention upper body dressing;bathing;feeding  -AR       Row Name 04/24/25 1757          Mobility    Extremity Weight-bearing Status right upper extremity  -AR      Right Upper Extremity (Weight-bearing Status) non weight-bearing (NWB)  -AR       Row Name 04/24/25 1757          Upper Body Dressing Assessment/Training    [place] Level (Upper Body Dressing) doff;don;maximum assist (25% patient effort)  -AR     Position (Upper Body Dressing) edge of bed sitting  -AR     Comment, (Upper Body Dressing) Educated pt on shoulder precautions, ADL retraining to maintain, sling management. Spouse required min assist don/doff sling post teaching.  -AR       Row Name 04/24/25 1757          Bathing Assessment/Intervention    Comment, (Bathing) Educated pt and spouse on shoulder precautions and axilla care to maintain.  -AR       Row Name 04/24/25 1757          Self-Feeding Assessment/Training    [place] Level (Feeding) liquids to mouth;maximum assist (25% patient effort)  -AR     Position (Feeding) supine  -AR               User Key  (r) = Recorded By, (t) = Taken By, (c) = Cosigned By      Initials Name Provider Type    AR Helen oRyal, OT Occupational Therapist                   Obj/Interventions       Row Name 04/24/25 1759          Sensory Assessment (Somatosensory)    Sensory Assessment (Somatosensory) right UE  -AR     Sensory Subjective Reports numbness  -AR       Row Name 04/24/25 1759          Vision Assessment/Intervention    Visual Impairment/Limitations corrective lenses full-time  -AR       Row Name 04/24/25 1759          Range of Motion Comprehensive    Comment, General Range of Motion SOPHYE WNL, R elbow/wrist/hand WFL  -AR       Row Name 04/24/25 1759          Strength Comprehensive (MMT)    Comment, General Manual Muscle Testing (MMT) Assessment LUE WNL, RUE deferred  -AR       Row Name 04/24/25 1759          Elbow/Forearm (Therapeutic Exercise)    Elbow/Forearm (Therapeutic Exercise) AAROM (active assistive range of motion)  -AR     Elbow/Forearm AAROM (Therapeutic Exercise) right;flexion;extension;supination;pronation;sitting;10 repetitions  -AR       Row  Name 04/24/25 1759          Wrist (Therapeutic Exercise)    Wrist (Therapeutic Exercise) AROM (active range of motion)  -AR     Wrist AROM (Therapeutic Exercise) right;flexion;extension;10 repetitions  -AR       Row Name 04/24/25 1759          Hand (Therapeutic Exercise)    Hand (Therapeutic Exercise) AROM (active range of motion)  -AR     Hand AROM/AAROM (Therapeutic Exercise) right;AROM (active range of motion);finger extension;finger flexion;10 repetitions  -AR       Row Name 04/24/25 1759          Motor Skills    Therapeutic Exercise elbow/forearm;wrist;hand  Issued and reviewed written HEP  -AR       Row Name 04/24/25 1759          Balance    Balance Assessment sitting static balance;sitting dynamic balance  -AR     Static Sitting Balance supervision  -AR     Dynamic Sitting Balance supervision  -AR     Position, Sitting Balance unsupported;sitting edge of bed  -AR               User Key  (r) = Recorded By, (t) = Taken By, (c) = Cosigned By      Initials Name Provider Type    Helen Shannon, OT Occupational Therapist                   Goals/Plan       Row Name 04/24/25 1803          Transfer Goal 1 (OT)    Activity/Assistive Device (Transfer Goal 1, OT) sit-to-stand/stand-to-sit;toilet  -AR     [place] Level/Cues Needed (Transfer Goal 1, OT) supervision required;verbal cues required  -AR     Time Frame (Transfer Goal 1, OT) long term goal (LTG);5 days  -AR     Progress/Outcome (Transfer Goal 1, OT) goal ongoing  -AR       Row Name 04/24/25 1803          Dressing Goal 1 (OT)    Time Frame (Dressing Goal 1, OT) short term goal (STG);2 days  -AR     Strategies/Barriers (Dressing Goal 1, OT) Pt and/or family will don/doff sling with supervision  -AR     Progress/Outcome (Dressing Goal 1, OT) goal ongoing  -AR       Row Name 04/24/25 1803          ROM Goal 1 (OT)    Time Frame (ROM Goal 1, OT) short term goal (STG);2 days  -AR     Strategies/Barriers (ROM Goal 1, OT) Pt and/or family will complete UE  HEP within physician parameters with supervison  -AR     Progress/Outcome (ROM Goal 1, OT) goal ongoing  -AR       Row Name 04/24/25 1803          Therapy Assessment/Plan (OT)    Planned Therapy Interventions (OT) BADL retraining;edema control/reduction;adaptive equipment training;IADL retraining;occupation/activity based interventions;orthotic fabrication/fitting/training;patient/caregiver education/training;ROM/therapeutic exercise;transfer/mobility retraining  -AR               User Key  (r) = Recorded By, (t) = Taken By, (c) = Cosigned By      Initials Name Provider Type    Helen Shannon, HIREN Occupational Therapist                   Clinical Impression       Row Name 04/24/25 1800          Pain Assessment    Pretreatment Pain Rating 0/10 - no pain  -AR     Posttreatment Pain Rating 0/10 - no pain  -AR       Row Name 04/24/25 1800          Plan of Care Review    Plan of Care Reviewed With patient;spouse  -AR     Outcome Evaluation OT educated pt and family on shoulder precautions, ADL retraining to maintain, sling management and HEP. Pt declined ambulation d/t fatigue and nausea, unable to complete mobility screen. Recommend PT eval d/t recent fall h/o. Pt lives with spouse who can assist as needed. Recommend DC home with initial 24/7 assist pending succesful completion of mobility with PT.  -AR       Row Name 04/24/25 1800          Therapy Assessment/Plan (OT)    Rehab Potential (OT) good  -AR     Criteria for Skilled Therapeutic Interventions Met (OT) yes  -AR     Therapy Frequency (OT) daily  -AR       Row Name 04/24/25 1800          Therapy Plan Review/Discharge Plan (OT)    Anticipated Discharge Disposition (OT) home with 24/7 care  -AR       Row Name 04/24/25 1800          Vital Signs    Pre Systolic BP Rehab 142  -AR     Pre Treatment Diastolic BP 61  -AR     Post Systolic BP Rehab 160  -AR     Post Treatment Diastolic BP 67  RN at bedside  -AR     Pre SpO2 (%) 95  -AR     O2 Delivery Pre Treatment  supplemental O2  -AR     Post SpO2 (%) 94  -AR     O2 Delivery Post Treatment supplemental O2  -AR     Pre Patient Position Supine  -AR     Intra Patient Position Sitting  -AR     Post Patient Position Supine  -AR       Row Name 04/24/25 1800          Positioning and Restraints    Pre-Treatment Position in bed  -AR     Post Treatment Position bed  -AR     In Bed supine;call light within reach;encouraged to call for assist;exit alarm on;with family/caregiver;SCD pump applied;with brace  -AR               User Key  (r) = Recorded By, (t) = Taken By, (c) = Cosigned By      Initials Name Provider Type    Helen Shannon, OT Occupational Therapist                   Outcome Measures       Row Name 04/24/25 1804          How much help from another is currently needed...    Putting on and taking off regular lower body clothing? 2  -AR     Bathing (including washing, rinsing, and drying) 2  -AR     Toileting (which includes using toilet bed pan or urinal) 2  -AR     Putting on and taking off regular upper body clothing 2  -AR     Taking care of personal grooming (such as brushing teeth) 3  -AR     Eating meals 3  -AR     AM-PAC 6 Clicks Score (OT) 14  -AR       Row Name 04/24/25 1552          How much help from another person do you currently need...    Turning from your back to your side while in flat bed without using bedrails? 3  -GS     Moving from lying on back to sitting on the side of a flat bed without bedrails? 3  -GS     Moving to and from a bed to a chair (including a wheelchair)? 3  -GS     Standing up from a chair using your arms (e.g., wheelchair, bedside chair)? 3  -GS     Climbing 3-5 steps with a railing? 2  -GS     To walk in hospital room? 3  -GS     AM-PAC 6 Clicks Score (PT) 17  -GS       Row Name 04/24/25 1804          Functional Assessment    Outcome Measure Options AM-PAC 6 Clicks Daily Activity (OT)  -AR               User Key  (r) = Recorded By, (t) = Taken By, (c) = Cosigned By      Initials  Name Provider Type    Helen Shannon OT Occupational Therapist    Mili Cobian RN Registered Nurse                    Occupational Therapy Education       Title: PT OT SLP Therapies (Done)       Topic: Occupational Therapy (Done)       Point: ADL training (Done)       Learning Progress Summary            Patient Eager, E,TB,D,H, VU by AR at 4/24/2025 1804   Family Eager, E,TB,D,H, VU by AR at 4/24/2025 1804                      Point: Home exercise program (Done)       Learning Progress Summary            Patient Eager, E,TB,D,H, VU by AR at 4/24/2025 1804   Family Eager, E,TB,D,H, VU by AR at 4/24/2025 1804                      Point: Precautions (Done)       Learning Progress Summary            Patient Eager, E,TB,D,H, VU by AR at 4/24/2025 1804   Family Eager, E,TB,D,H, VU by AR at 4/24/2025 1804                      Point: Body mechanics (Done)       Learning Progress Summary            Patient Eager, E,TB,D,H, VU by AR at 4/24/2025 1804   Family Eager, E,TB,D,H, VU by AR at 4/24/2025 1804                                      User Key       Initials Effective Dates Name Provider Type Discipline    AR 07/11/23 -  Helen Royal OT Occupational Therapist OT                  OT Recommendation and Plan  Planned Therapy Interventions (OT): BADL retraining, edema control/reduction, adaptive equipment training, IADL retraining, occupation/activity based interventions, orthotic fabrication/fitting/training, patient/caregiver education/training, ROM/therapeutic exercise, transfer/mobility retraining  Therapy Frequency (OT): daily  Plan of Care Review  Plan of Care Reviewed With: patient, spouse  Outcome Evaluation: OT educated pt and family on shoulder precautions, ADL retraining to maintain, sling management and HEP. Pt declined ambulation d/t fatigue and nausea, unable to complete mobility screen. Recommend PT eval d/t recent fall h/o. Pt lives with spouse who can assist as needed. Recommend DC  home with initial 24/7 assist pending succesful completion of mobility with PT.     Time Calculation:   Evaluation Complexity (OT)  Review Occupational Profile/Medical/Therapy History Complexity: brief/low complexity  Assessment, Occupational Performance/Identification of Deficit Complexity: 1-3 performance deficits  Clinical Decision Making Complexity (OT): problem focused assessment/low complexity  Overall Complexity of Evaluation (OT): low complexity     Time Calculation- OT       Row Name 04/24/25 1805             Time Calculation- OT    OT Start Time 1703  -AR      OT Received On 04/24/25  -AR      OT Goal Re-Cert Due Date 05/04/25  -AR         Timed Charges    22800 - OT Therapeutic Exercise Minutes 12  -AR      89426 - OT Self Care/Mgmt Minutes 18  -AR         Untimed Charges    OT Eval/Re-eval Minutes 45  -AR         Total Minutes    Timed Charges Total Minutes 30  -AR      Untimed Charges Total Minutes 45  -AR       Total Minutes 75  -AR                User Key  (r) = Recorded By, (t) = Taken By, (c) = Cosigned By      Initials Name Provider Type    AR Helen Royal OT Occupational Therapist                  Therapy Charges for Today       Code Description Service Date Service Provider Modifiers Qty    52982202987 HC OT SELF CARE/MGMT/TRAIN EA 15 MIN 4/24/2025 Helen Royal OT GO 1    29284566714 HC OT THER PROC EA 15 MIN 4/24/2025 Helen Royal OT GO 1    08518278479 HC OT EVAL LOW COMPLEXITY 3 4/24/2025 Helen Royal OT GO 1                 Helen Royal OT  4/24/2025

## 2025-04-24 NOTE — OP NOTE
Operative Report Reverse Total Shoulder Arthroplasty     DATE OF OPERATION: 4/24/2025     PREOPERATIVE DIAGNOSIS: right shoulder rotator cuff arthropathy         POSTOPERATIVE DIAGNOSES:  1. same        PROCEDURES PERFORMED:  1. right reverse total shoulder arthroplasty.          SURGEON: Norbert Delcid MD           Assistant: Mario Hernández PA-C  ** Please note the physician assistant was medically necessary to assist with positioning retraction, arm positioning, care of soft tissues and closure      ANESTHESIA: General plus block.       ESTIMATED BLOOD LOSS:100mL.       COMPLICATIONS: None.       DISPOSITION: Recovery room in stable condition.      IMPLANTS:  tornier reverse total shoulder system  Humeral Stem: size 2 std short  Humeral Tray: as above  Polyethylene Liner: 33+3 10 deg medial lip  Baseplate: 25 full wedge with 6.5 x 35mm screw  Glenosphere: 33+3     INDICATIONS: This is a 80-year-old female with right shoulder pain and limited function and motion secondary to above diagnosis. They have failed conservative treatment and after a discussion of risks, benefits, and alternatives, wished to proceed with shoulder arthroplasty.     DESCRIPTION OF PROCEDURE: On the day of surgery, the patient identified the right shoulder as the correct operative extremity. This was initialed by the surgeon with the patients's acknowledgment. The patient underwent placement of an interscalene block and was taken to the operating room and placed in the supine position. Upon induction of adequate anesthesia, the patient was brought up to the beach chair position and the shoulder and upper extremity were prepped and draped in the usual sterile fashion. Timeout confirmed the correct patient and operative extremity as well as that antibiotics were on board. A standard deltopectoral approach to the shoulder was carried out. It was carried sharply through the skin and subcutaneous tissue. Medial and lateral flaps were developed  over the deltopectoral fascia. The cephalic vein was identified and mobilized laterally with the deltoid. The subdeltoid and subpectoral spaces were mobilized and a blunt retractor was placed deep to this. The clavipectoral fascia was opened on the lateral edge of the conjoined tendon and the retractor was moved deep to this. The leading edge of the pectoralis was released exposing the long head of the biceps. This was tenosynovitic and therefore tenotomized. The 3 sisters were identified and coagulated. A subscapularis tenotomy was performed and rotator interval was released to the glenoid exposing the humeral head. The inferior capsule was released directly off the humerus to allow greater than 90° of external rotation. The anatomic neck was exposed and the humeral head osteotomy was performed in approximately 20° of retroversion. The remainder of the osteophytes were removed. The canal was then entered, reamed, and broached. The final stem impacted in in approximately 20° of retroversion. A head protector was placed. The humerus was subluxed posteriorly. The glenoid exposed. Circumferential labral excision and capsular release were performed. A  mobilization of the subscapularis was carried out as well.  A centering hole was drilled. The glenoid was gently reamed and then the  central hole for the baseplate was drilled  glenoid baseplate inserted.  Screws were then placed through the baseplate  The glenosphere was then inserted and locked into place with a set screw.  The humerus was carefully subluxed back anteriorly. A liner tray and polyethylene were placed and trialing was carried out. The appropriate final sizes were chosen and locked into place.  The shoulder was then reduced.  This allowed nearly full passive range of motion with no instability. The joint was copiously irrigated with orthopedic irrigation mixed with Betadine after the final implants were assembled and locked into place.      vancomycin  powder was placed in the wound       The deltopectoral interval was approximated with 0 Vicryl, the subcutaneous tissue with 2-0 Vicryl, and the skin with nylon. A sterile dressing was placed. Anesthesia was reversed and the patient was taken to the recovery room in stable condition. All instrument, needle, and sponge counts were correct.

## 2025-04-24 NOTE — ANESTHESIA PROCEDURE NOTES
Right PECs 1&2      Patient reassessed immediately prior to procedure    Patient location during procedure: OR  Start time: 4/24/2025 12:00 PM  Reason for block: at surgeon's request and post-op pain management  Performed by  CRNA/CAA: Mich Simpson CRNA  Assisted by: Monika Stapleton RN  Preanesthetic Checklist  Completed: patient identified, IV checked, site marked, risks and benefits discussed, surgical consent, monitors and equipment checked, pre-op evaluation and timeout performed  Prep:  Pt Position: supine  Sterile barriers:cap, gloves, mask and washed/disinfected hands  Prep: ChloraPrep  Patient monitoring: blood pressure monitoring, continuous pulse oximetry and EKG  Procedure    Sedation: yes  Performed under: general  Guidance:ultrasound guided and landmark technique  Images:still images obtained, printed/placed on chart    Laterality:right  Block Type:PECS I and PECS II  Injection Technique:single-shot  Needle Type:short-bevel  Needle Gauge:20 G  Resistance on Injection: none    Medications Used: dexamethasone sodium phosphate injection - Injection   2 mg - 4/24/2025 12:00:00 PM  bupivacaine PF (MARCAINE) 0.25 % injection - Injection   25 mL - 4/24/2025 12:00:00 PM      Medications  Preservative Free Saline:10ml  Comment:Block Injection:  Total volume of LA divided between Right and Left sided blocks         Post Assessment  Injection Assessment: negative aspiration for heme, incremental injection and no paresthesia on injection  Patient Tolerance:comfortable throughout block  Complications:no  Additional Notes  Interpectoral-Pectoserratus Plane   A high-frequency linear transducer, with sterile cover, was placed medial to the coracoid process in the paramedian sagittal plane. The transducer was moved caudally to the 4th rib and rotated slightly to allow an in-plane needle trajectory from medial to lateral. Pectoralis Major Muscle (PMM), Pectoralis Minor Muscle (PmM), Thoracoacromial Artery, Ribs,  "and Pleura were identified under ultrasound. The insertion site was prepped in sterile fashion and then localized with 2-5 ml of 1% Lidocaine. Using ultrasound-guidance, a 20-gauge B-Fontaine 4\" Ultraplex 360 non-stimulating echogenic needle was advanced in plane until the tip of the needle was in the fascial plane between the PMM and PmM, lateral to the Thoracoacromial Artery. 1-3ml of preservative free normal saline was used to hydro-dissect the fascial planes. After the fascial plane was verified, 10ml local anesthetic (LA) was injected for Interpectoral fascial plane block. The needle was continued along the same path to the level of the 4th rib below PmM.  Initially preservative free normal saline was used to confirm needle position and then 20 ml of LA was injected for Pectoserratus fascial plane block. Aspiration every 5 ml to prevent intravascular injection. Injection was completed with negative aspiration of blood and negative intravascular injection. Injection pressures were normal with minimal resistance.     Performed by: Mich Simpson CRNA            "

## 2025-04-25 LAB
ANION GAP SERPL CALCULATED.3IONS-SCNC: 12 MMOL/L (ref 5–15)
BASOPHILS # BLD AUTO: 0.02 10*3/MM3 (ref 0–0.2)
BASOPHILS NFR BLD AUTO: 0.4 % (ref 0–1.5)
BUN SERPL-MCNC: 9 MG/DL (ref 8–23)
BUN/CREAT SERPL: 8.6 (ref 7–25)
CALCIUM SPEC-SCNC: 7.7 MG/DL (ref 8.6–10.5)
CHLORIDE SERPL-SCNC: 111 MMOL/L (ref 98–107)
CO2 SERPL-SCNC: 21 MMOL/L (ref 22–29)
CREAT SERPL-MCNC: 1.05 MG/DL (ref 0.57–1)
D DIMER PPP FEU-MCNC: 0.35 MCGFEU/ML (ref 0–0.81)
DEPRECATED RDW RBC AUTO: 47.5 FL (ref 37–54)
EGFRCR SERPLBLD CKD-EPI 2021: 53.5 ML/MIN/1.73
EOSINOPHIL # BLD AUTO: 0.07 10*3/MM3 (ref 0–0.4)
EOSINOPHIL NFR BLD AUTO: 1.3 % (ref 0.3–6.2)
ERYTHROCYTE [DISTWIDTH] IN BLOOD BY AUTOMATED COUNT: 13.2 % (ref 12.3–15.4)
GEN 5 1HR TROPONIN T REFLEX: 23 NG/L
GLUCOSE BLDC GLUCOMTR-MCNC: 83 MG/DL (ref 70–130)
GLUCOSE SERPL-MCNC: 83 MG/DL (ref 65–99)
HCT VFR BLD AUTO: 31 % (ref 34–46.6)
HGB BLD-MCNC: 9.9 G/DL (ref 12–15.9)
IMM GRANULOCYTES # BLD AUTO: 0.01 10*3/MM3 (ref 0–0.05)
IMM GRANULOCYTES NFR BLD AUTO: 0.2 % (ref 0–0.5)
INR PPP: 1.12 (ref 0.89–1.12)
LYMPHOCYTES # BLD AUTO: 0.89 10*3/MM3 (ref 0.7–3.1)
LYMPHOCYTES NFR BLD AUTO: 17.1 % (ref 19.6–45.3)
MCH RBC QN AUTO: 31.3 PG (ref 26.6–33)
MCHC RBC AUTO-ENTMCNC: 31.9 G/DL (ref 31.5–35.7)
MCV RBC AUTO: 98.1 FL (ref 79–97)
MONOCYTES # BLD AUTO: 0.6 10*3/MM3 (ref 0.1–0.9)
MONOCYTES NFR BLD AUTO: 11.5 % (ref 5–12)
NEUTROPHILS NFR BLD AUTO: 3.61 10*3/MM3 (ref 1.7–7)
NEUTROPHILS NFR BLD AUTO: 69.5 % (ref 42.7–76)
NRBC BLD AUTO-RTO: 0 /100 WBC (ref 0–0.2)
PLATELET # BLD AUTO: 144 10*3/MM3 (ref 140–450)
PMV BLD AUTO: 11.3 FL (ref 6–12)
POTASSIUM SERPL-SCNC: 3.4 MMOL/L (ref 3.5–5.2)
PROTHROMBIN TIME: 15.1 SECONDS (ref 12.2–15.3)
RBC # BLD AUTO: 3.16 10*6/MM3 (ref 3.77–5.28)
SODIUM SERPL-SCNC: 144 MMOL/L (ref 136–145)
TROPONIN T % DELTA: 0
TROPONIN T NUMERIC DELTA: 0 NG/L
TROPONIN T SERPL HS-MCNC: 23 NG/L
WBC NRBC COR # BLD AUTO: 5.2 10*3/MM3 (ref 3.4–10.8)

## 2025-04-25 PROCEDURE — 97110 THERAPEUTIC EXERCISES: CPT

## 2025-04-25 PROCEDURE — 97116 GAIT TRAINING THERAPY: CPT

## 2025-04-25 PROCEDURE — 63710000001 NITROGLYCERIN 0.4 MG SUBLINGUAL TABLET: Performed by: INTERNAL MEDICINE

## 2025-04-25 PROCEDURE — 63710000001 LEVOTHYROXINE 88 MCG TABLET: Performed by: INTERNAL MEDICINE

## 2025-04-25 PROCEDURE — 82948 REAGENT STRIP/BLOOD GLUCOSE: CPT

## 2025-04-25 PROCEDURE — 63710000001 LISINOPRIL 20 MG TABLET: Performed by: INTERNAL MEDICINE

## 2025-04-25 PROCEDURE — A9270 NON-COVERED ITEM OR SERVICE: HCPCS | Performed by: INTERNAL MEDICINE

## 2025-04-25 PROCEDURE — 63710000001 EMPAGLIFLOZIN 10 MG TABLET: Performed by: INTERNAL MEDICINE

## 2025-04-25 PROCEDURE — 63710000001 POTASSIUM CHLORIDE 10 MEQ CAPSULE CONTROLLED-RELEASE: Performed by: INTERNAL MEDICINE

## 2025-04-25 PROCEDURE — 85379 FIBRIN DEGRADATION QUANT: CPT | Performed by: INTERNAL MEDICINE

## 2025-04-25 PROCEDURE — 97161 PT EVAL LOW COMPLEX 20 MIN: CPT

## 2025-04-25 PROCEDURE — 25010000002 CEFAZOLIN PER 500 MG: Performed by: ORTHOPAEDIC SURGERY

## 2025-04-25 PROCEDURE — 63710000001 OXYCODONE 5 MG TABLET: Performed by: ORTHOPAEDIC SURGERY

## 2025-04-25 PROCEDURE — A9270 NON-COVERED ITEM OR SERVICE: HCPCS

## 2025-04-25 PROCEDURE — 25010000002 HYDROMORPHONE PER 4 MG: Performed by: ORTHOPAEDIC SURGERY

## 2025-04-25 PROCEDURE — 85610 PROTHROMBIN TIME: CPT | Performed by: INTERNAL MEDICINE

## 2025-04-25 PROCEDURE — 94799 UNLISTED PULMONARY SVC/PX: CPT

## 2025-04-25 PROCEDURE — 25010000002 ENOXAPARIN PER 10 MG: Performed by: ORTHOPAEDIC SURGERY

## 2025-04-25 PROCEDURE — 97535 SELF CARE MNGMENT TRAINING: CPT

## 2025-04-25 PROCEDURE — A9270 NON-COVERED ITEM OR SERVICE: HCPCS | Performed by: ORTHOPAEDIC SURGERY

## 2025-04-25 PROCEDURE — 63710000001 CLOPIDOGREL 75 MG TABLET: Performed by: INTERNAL MEDICINE

## 2025-04-25 PROCEDURE — 80048 BASIC METABOLIC PNL TOTAL CA: CPT | Performed by: ORTHOPAEDIC SURGERY

## 2025-04-25 PROCEDURE — 94664 DEMO&/EVAL PT USE INHALER: CPT

## 2025-04-25 PROCEDURE — 94640 AIRWAY INHALATION TREATMENT: CPT

## 2025-04-25 PROCEDURE — 63710000001 PANTOPRAZOLE 40 MG TABLET DELAYED-RELEASE: Performed by: INTERNAL MEDICINE

## 2025-04-25 PROCEDURE — 63710000001 WARFARIN 3 MG TABLET

## 2025-04-25 PROCEDURE — 63710000001 REVEFENACIN 175 MCG/3ML SOLUTION: Performed by: INTERNAL MEDICINE

## 2025-04-25 PROCEDURE — 94761 N-INVAS EAR/PLS OXIMETRY MLT: CPT

## 2025-04-25 PROCEDURE — 63710000001 BUTALBITAL-ACETAMINOPHEN-CAFFEINE 50-325-40 MG TABLET: Performed by: INTERNAL MEDICINE

## 2025-04-25 PROCEDURE — 84484 ASSAY OF TROPONIN QUANT: CPT | Performed by: INTERNAL MEDICINE

## 2025-04-25 PROCEDURE — 63710000001 NITROGLYCERIN 0.4 MG SUBLINGUAL TABLET

## 2025-04-25 PROCEDURE — 85025 COMPLETE CBC W/AUTO DIFF WBC: CPT | Performed by: ORTHOPAEDIC SURGERY

## 2025-04-25 PROCEDURE — 93005 ELECTROCARDIOGRAM TRACING: CPT | Performed by: INTERNAL MEDICINE

## 2025-04-25 RX ORDER — NITROGLYCERIN 0.4 MG/1
0.4 TABLET SUBLINGUAL
Status: DISCONTINUED | OUTPATIENT
Start: 2025-04-25 | End: 2025-04-26 | Stop reason: HOSPADM

## 2025-04-25 RX ORDER — CLOPIDOGREL BISULFATE 75 MG/1
75 TABLET ORAL DAILY
Start: 2025-04-26

## 2025-04-25 RX ORDER — CLOPIDOGREL BISULFATE 75 MG/1
75 TABLET ORAL DAILY
Status: DISCONTINUED | OUTPATIENT
Start: 2025-04-25 | End: 2025-04-26 | Stop reason: HOSPADM

## 2025-04-25 RX ORDER — POTASSIUM CHLORIDE 750 MG/1
40 CAPSULE, EXTENDED RELEASE ORAL ONCE
Status: COMPLETED | OUTPATIENT
Start: 2025-04-25 | End: 2025-04-25

## 2025-04-25 RX ORDER — ROPIVACAINE HYDROCHLORIDE 2 MG/ML
2 INJECTION, SOLUTION EPIDURAL; INFILTRATION; PERINEURAL CONTINUOUS
Start: 2025-04-25

## 2025-04-25 RX ORDER — ENOXAPARIN SODIUM 100 MG/ML
1 INJECTION SUBCUTANEOUS EVERY 24 HOURS
Start: 2025-04-26 | End: 2025-04-26

## 2025-04-25 RX ORDER — WARFARIN SODIUM 3 MG/1
6 TABLET ORAL
Status: COMPLETED | OUTPATIENT
Start: 2025-04-25 | End: 2025-04-25

## 2025-04-25 RX ORDER — NITROGLYCERIN 0.4 MG/1
TABLET SUBLINGUAL
Status: COMPLETED
Start: 2025-04-25 | End: 2025-04-25

## 2025-04-25 RX ORDER — ENOXAPARIN SODIUM 100 MG/ML
1 INJECTION SUBCUTANEOUS EVERY 24 HOURS
Status: DISCONTINUED | OUTPATIENT
Start: 2025-04-25 | End: 2025-04-26 | Stop reason: HOSPADM

## 2025-04-25 RX ADMIN — NITROGLYCERIN: 0.4 TABLET, ORALLY DISINTEGRATING SUBLINGUAL at 16:07

## 2025-04-25 RX ADMIN — PANTOPRAZOLE SODIUM 40 MG: 40 TABLET, DELAYED RELEASE ORAL at 09:09

## 2025-04-25 RX ADMIN — REVEFENACIN 175 MCG: 175 SOLUTION RESPIRATORY (INHALATION) at 11:29

## 2025-04-25 RX ADMIN — ENOXAPARIN SODIUM 60 MG: 60 INJECTION SUBCUTANEOUS at 09:09

## 2025-04-25 RX ADMIN — HYDROMORPHONE HYDROCHLORIDE 0.5 MG: 1 INJECTION, SOLUTION INTRAMUSCULAR; INTRAVENOUS; SUBCUTANEOUS at 17:49

## 2025-04-25 RX ADMIN — LISINOPRIL 20 MG: 20 TABLET ORAL at 20:50

## 2025-04-25 RX ADMIN — POTASSIUM CHLORIDE 40 MEQ: 750 CAPSULE, EXTENDED RELEASE ORAL at 16:15

## 2025-04-25 RX ADMIN — CLOPIDOGREL BISULFATE 75 MG: 75 TABLET, FILM COATED ORAL at 16:42

## 2025-04-25 RX ADMIN — ARFORMOTEROL TARTRATE 15 MCG: 15 SOLUTION RESPIRATORY (INHALATION) at 19:16

## 2025-04-25 RX ADMIN — SODIUM CHLORIDE 2000 MG: 900 INJECTION INTRAVENOUS at 06:53

## 2025-04-25 RX ADMIN — WARFARIN SODIUM 6 MG: 3 TABLET ORAL at 16:42

## 2025-04-25 RX ADMIN — EMPAGLIFLOZIN 10 MG: 10 TABLET, FILM COATED ORAL at 09:09

## 2025-04-25 RX ADMIN — BUTALBITAL, ACETAMINOPHEN, AND CAFFEINE 1 TABLET: 325; 50; 40 TABLET ORAL at 09:09

## 2025-04-25 RX ADMIN — OXYCODONE HYDROCHLORIDE 5 MG: 5 TABLET ORAL at 16:42

## 2025-04-25 RX ADMIN — LISINOPRIL 20 MG: 20 TABLET ORAL at 09:09

## 2025-04-25 RX ADMIN — OXYCODONE HYDROCHLORIDE 5 MG: 5 TABLET ORAL at 20:51

## 2025-04-25 RX ADMIN — NITROGLYCERIN 0.4 MG: 0.4 TABLET, ORALLY DISINTEGRATING SUBLINGUAL at 16:07

## 2025-04-25 RX ADMIN — NITROGLYCERIN 0.4 MG: 0.4 TABLET, ORALLY DISINTEGRATING SUBLINGUAL at 17:49

## 2025-04-25 RX ADMIN — ARFORMOTEROL TARTRATE 15 MCG: 15 SOLUTION RESPIRATORY (INHALATION) at 11:29

## 2025-04-25 NOTE — PLAN OF CARE
Problem: Adult Inpatient Plan of Care  Goal: Plan of Care Review  Outcome: Progressing  Flowsheets  Taken 4/25/2025 1724 by Mili Paniagua RN  Progress: no change  Taken 4/25/2025 1321 by Thu Valle PT  Plan of Care Reviewed With:   patient   spouse  Goal: Patient-Specific Goal (Individualized)  Outcome: Progressing  Goal: Absence of Hospital-Acquired Illness or Injury  Outcome: Progressing  Intervention: Identify and Manage Fall Risk  Recent Flowsheet Documentation  Taken 4/25/2025 1400 by Mili Paniagua RN  Safety Promotion/Fall Prevention:   activity supervised   assistive device/personal items within reach   clutter free environment maintained   room organization consistent   safety round/check completed   toileting scheduled  Taken 4/25/2025 1200 by Mili Paniagua RN  Safety Promotion/Fall Prevention:   activity supervised   assistive device/personal items within reach   clutter free environment maintained   room organization consistent   safety round/check completed   toileting scheduled  Taken 4/25/2025 1000 by Mili Paniagua RN  Safety Promotion/Fall Prevention:   activity supervised   assistive device/personal items within reach   clutter free environment maintained   safety round/check completed   room organization consistent   toileting scheduled  Taken 4/25/2025 0909 by Mili Paniagua RN  Safety Promotion/Fall Prevention:   activity supervised   assistive device/personal items within reach   clutter free environment maintained   room organization consistent   safety round/check completed   toileting scheduled  Intervention: Prevent Skin Injury  Recent Flowsheet Documentation  Taken 4/25/2025 1400 by Mili Paniagua RN  Body Position: position changed independently  Taken 4/25/2025 1200 by Mili Paniagua RN  Body Position: legs elevated  Taken 4/25/2025 1000 by Mili Paniagua RN  Body Position: position changed independently  Taken 4/25/2025 0909 by Mili Paniagua RN  Body Position: position  changed independently  Intervention: Prevent and Manage VTE (Venous Thromboembolism) Risk  Recent Flowsheet Documentation  Taken 4/25/2025 1400 by Mili Paniagua RN  VTE Prevention/Management: patient refused intervention  Taken 4/25/2025 1200 by Mili Paniagua RN  VTE Prevention/Management: patient refused intervention  Taken 4/25/2025 1000 by Mili Paniagua RN  VTE Prevention/Management: patient refused intervention  Taken 4/25/2025 0909 by Mili Paniagua RN  VTE Prevention/Management:   bilateral   SCDs (sequential compression devices) off   patient refused intervention  Intervention: Prevent Infection  Recent Flowsheet Documentation  Taken 4/25/2025 1400 by Mili Paniagua RN  Infection Prevention:   environmental surveillance performed   rest/sleep promoted  Taken 4/25/2025 1200 by Mili Paniagua RN  Infection Prevention:   environmental surveillance performed   rest/sleep promoted  Taken 4/25/2025 1000 by Mili Paniagua RN  Infection Prevention:   environmental surveillance performed   rest/sleep promoted  Taken 4/25/2025 0909 by Mili Paniagua RN  Infection Prevention:   environmental surveillance performed   rest/sleep promoted  Goal: Optimal Comfort and Wellbeing  Outcome: Progressing  Intervention: Provide Person-Centered Care  Recent Flowsheet Documentation  Taken 4/25/2025 1200 by Mili Paniagua RN  Trust Relationship/Rapport: care explained  Taken 4/25/2025 0909 by Mili Paniagua RN  Trust Relationship/Rapport: care explained  Goal: Readiness for Transition of Care  Outcome: Progressing     Problem: Comorbidity Management  Goal: Blood Pressure in Desired Range  Outcome: Progressing  Intervention: Maintain Blood Pressure Management  Recent Flowsheet Documentation  Taken 4/25/2025 1400 by Mili Paniagua RN  Medication Review/Management: medications reviewed  Taken 4/25/2025 1200 by Mili Paniagua RN  Medication Review/Management: medications reviewed  Taken 4/25/2025 1000 by Mili Paniagua  RN  Medication Review/Management: medications reviewed  Taken 4/25/2025 0909 by Mili Paniagua RN  Medication Review/Management: medications reviewed     Problem: Skin Injury Risk Increased  Goal: Skin Health and Integrity  Outcome: Progressing  Intervention: Optimize Skin Protection  Recent Flowsheet Documentation  Taken 4/25/2025 1400 by Mili Paniagua RN  Activity Management: up in chair  Taken 4/25/2025 1200 by Mili Paniagua RN  Activity Management: up in chair  Taken 4/25/2025 1000 by Mili Paniagua RN  Activity Management: activity encouraged  Head of Bed (HOB) Positioning: HOB at 30-45 degrees  Taken 4/25/2025 0909 by Mili Paniagua RN  Activity Management: activity encouraged  Head of Bed (HOB) Positioning: HOB at 30-45 degrees     Problem: Shoulder Arthroplasty (Total, Geovanny, Reverse)  Goal: Optimal Coping  Outcome: Progressing  Goal: Absence of Bleeding  Outcome: Progressing  Goal: Effective Bowel Elimination  Outcome: Progressing  Goal: Fluid and Electrolyte Balance  Outcome: Progressing  Goal: Optimal Functional Ability  Outcome: Progressing  Intervention: Promote Optimal Functional Status  Recent Flowsheet Documentation  Taken 4/25/2025 1400 by Mili Paniagua RN  Activity Management: up in chair  Taken 4/25/2025 1200 by Mili Paniagua RN  Activity Management: up in chair  Taken 4/25/2025 1000 by Mili Paniagua RN  Activity Management: activity encouraged  Taken 4/25/2025 0909 by Mili Paniagua RN  Activity Management: activity encouraged  Goal: Absence of Infection Signs and Symptoms  Outcome: Progressing  Goal: Intact Neurovascular Status  Outcome: Progressing  Goal: Anesthesia/Sedation Recovery  Outcome: Progressing  Intervention: Optimize Anesthesia Recovery  Recent Flowsheet Documentation  Taken 4/25/2025 1400 by Mili Paniagua RN  Safety Promotion/Fall Prevention:   activity supervised   assistive device/personal items within reach   clutter free environment maintained   room organization  consistent   safety round/check completed   toileting scheduled  Taken 4/25/2025 1200 by Mili Paniagua RN  Safety Promotion/Fall Prevention:   activity supervised   assistive device/personal items within reach   clutter free environment maintained   room organization consistent   safety round/check completed   toileting scheduled  Taken 4/25/2025 1000 by Mili Paniagua RN  Safety Promotion/Fall Prevention:   activity supervised   assistive device/personal items within reach   clutter free environment maintained   safety round/check completed   room organization consistent   toileting scheduled  Taken 4/25/2025 0909 by Mili Paniagua RN  Safety Promotion/Fall Prevention:   activity supervised   assistive device/personal items within reach   clutter free environment maintained   room organization consistent   safety round/check completed   toileting scheduled  Administration (IS): self-administered  Goal: Optimal Pain Control and Function  Outcome: Progressing  Goal: Nausea and Vomiting Relief  Outcome: Progressing  Goal: Effective Urinary Elimination  Outcome: Progressing     Problem: Fall Injury Risk  Goal: Absence of Fall and Fall-Related Injury  Outcome: Progressing  Intervention: Identify and Manage Contributors  Recent Flowsheet Documentation  Taken 4/25/2025 1400 by Mili Paniagua RN  Medication Review/Management: medications reviewed  Taken 4/25/2025 1200 by Mili Pnaiagua RN  Medication Review/Management: medications reviewed  Taken 4/25/2025 1000 by Mili Paniagua RN  Medication Review/Management: medications reviewed  Taken 4/25/2025 0909 by Mili Paniagua RN  Medication Review/Management: medications reviewed  Intervention: Promote Injury-Free Environment  Recent Flowsheet Documentation  Taken 4/25/2025 1400 by Mili Paniagua RN  Safety Promotion/Fall Prevention:   activity supervised   assistive device/personal items within reach   clutter free environment maintained   room organization  consistent   safety round/check completed   toileting scheduled  Taken 4/25/2025 1200 by Mili Paniagua RN  Safety Promotion/Fall Prevention:   activity supervised   assistive device/personal items within reach   clutter free environment maintained   room organization consistent   safety round/check completed   toileting scheduled  Taken 4/25/2025 1000 by Mili Paniagua RN  Safety Promotion/Fall Prevention:   activity supervised   assistive device/personal items within reach   clutter free environment maintained   safety round/check completed   room organization consistent   toileting scheduled  Taken 4/25/2025 0909 by Mili Paniagua RN  Safety Promotion/Fall Prevention:   activity supervised   assistive device/personal items within reach   clutter free environment maintained   room organization consistent   safety round/check completed   toileting scheduled   Goal Outcome Evaluation:           Progress: no change          Pt alert and oriented x4. VSS. RA to 2L O2. Upx1 assist. Dressing CDI. Sling in place. Voiding spontaneously without difficulty. Patient planned to go home and had discharge order. At 1552, Rapid response called for 5/10 chest pain. Dr. RAMIREZ and Dr. Delcid contacted. STAT EKG completed and one dose of PRN nitro administered as ordered. BP monitored. Troponin and d-dimer ordered. Troponin resulted 23, Dr RAMIREZ aware of result, no repeat labs ordered. Pt will stay over night for observation and pain control. PRN oxycodone 5mg administered @1642 for right shoulder pain. Possible d/c home tomorrow if medically stable.

## 2025-04-25 NOTE — PLAN OF CARE
Problem: Adult Inpatient Plan of Care  Goal: Plan of Care Review  Recent Flowsheet Documentation  Taken 4/25/2025 1517 by Lawanda Naranjo OT  Progress: improving  Outcome Evaluation: Pt is A/Ox4 and motivated to work with therapy. Education reviewed for R shoulder precautions, sling teaching, and ADL retraining. Pt needs Mod A UB ADLs and sling mgmt. RUE HEP complete with AROM @ hand, wrist, and elbow only. Spouse present and participating in all teaching. Pt is up in room and transfering with CGAx1. No LOB. Pt's thick gel manicure and cold fingers appear to disrupt pulse oximeter reading, monitor moved to toe and pt sats 93-96%. Pt reports good pain control. OT will d/c at this time. Recommend home with 24/7 assist from spouse when medically ready.

## 2025-04-25 NOTE — PLAN OF CARE
Problem: Adult Inpatient Plan of Care  Goal: Patient-Specific Goal (Individualized)  Outcome: Not Progressing     Problem: Adult Inpatient Plan of Care  Goal: Plan of Care Review  Outcome: Progressing  Goal: Patient-Specific Goal (Individualized)  Outcome: Not Progressing  Goal: Absence of Hospital-Acquired Illness or Injury  Outcome: Progressing  Intervention: Identify and Manage Fall Risk  Recent Flowsheet Documentation  Taken 4/25/2025 0338 by Kiana Aragon, RN  Safety Promotion/Fall Prevention:   activity supervised   clutter free environment maintained   elopement precautions   fall prevention program maintained   gait belt   mobility aid in reach   lighting adjusted   nonskid shoes/slippers when out of bed   room organization consistent   safety round/check completed   toileting scheduled  Taken 4/25/2025 0200 by Kiana Aragon, DIANA  Safety Promotion/Fall Prevention:   activity supervised   clutter free environment maintained   elopement precautions   fall prevention program maintained   gait belt   mobility aid in reach   lighting adjusted   nonskid shoes/slippers when out of bed   room organization consistent   safety round/check completed   toileting scheduled  Taken 4/25/2025 0000 by Kiana Aragon, RN  Safety Promotion/Fall Prevention:   activity supervised   clutter free environment maintained   elopement precautions   fall prevention program maintained   gait belt   mobility aid in reach   lighting adjusted   nonskid shoes/slippers when out of bed   room organization consistent   safety round/check completed   toileting scheduled  Taken 4/24/2025 2251 by Kiana Aragon, RN  Safety Promotion/Fall Prevention:   activity supervised   clutter free environment maintained   elopement precautions   fall prevention program maintained   gait belt   mobility aid in reach   lighting adjusted   nonskid shoes/slippers when out of bed   room organization consistent   safety round/check completed   toileting  scheduled  Intervention: Prevent Skin Injury  Recent Flowsheet Documentation  Taken 4/25/2025 0338 by Kiana Argaon RN  Body Position: position changed independently  Skin Protection: incontinence pads utilized  Taken 4/25/2025 0200 by Kiana Aragon RN  Body Position: position changed independently  Skin Protection: incontinence pads utilized  Taken 4/25/2025 0000 by Kiana Aragon RN  Body Position: position changed independently  Skin Protection: incontinence pads utilized  Taken 4/24/2025 2251 by Kiana Aragon RN  Skin Protection: incontinence pads utilized  Taken 4/24/2025 1917 by Kiana Aragon RN  Skin Protection: incontinence pads utilized  Intervention: Prevent and Manage VTE (Venous Thromboembolism) Risk  Recent Flowsheet Documentation  Taken 4/25/2025 0338 by Kiana Aragon RN  VTE Prevention/Management:   bilateral   SCDs (sequential compression devices) on  Taken 4/25/2025 0200 by Kiana Aragon RN  VTE Prevention/Management:   bilateral   SCDs (sequential compression devices) on  Taken 4/25/2025 0000 by Kiana Aragon RN  VTE Prevention/Management:   bilateral   SCDs (sequential compression devices) on  Taken 4/24/2025 2251 by Kiana Aragon RN  VTE Prevention/Management:   bilateral   SCDs (sequential compression devices) on  Taken 4/24/2025 1917 by Kiana Aragon RN  VTE Prevention/Management:   bilateral   SCDs (sequential compression devices) on  Intervention: Prevent Infection  Recent Flowsheet Documentation  Taken 4/25/2025 0338 by Kiana Aragon RN  Infection Prevention:   environmental surveillance performed   hand hygiene promoted   rest/sleep promoted  Taken 4/25/2025 0200 by Kiana Aragon RN  Infection Prevention:   environmental surveillance performed   hand hygiene promoted   rest/sleep promoted  Taken 4/25/2025 0000 by Kiana Aragon RN  Infection Prevention:   environmental surveillance performed   hand hygiene promoted   rest/sleep promoted  Taken 4/24/2025 2251 by Kiana Aragon RN  Infection  Prevention:   environmental surveillance performed   hand hygiene promoted   rest/sleep promoted  Goal: Optimal Comfort and Wellbeing  Outcome: Progressing  Intervention: Monitor Pain and Promote Comfort  Recent Flowsheet Documentation  Taken 4/25/2025 0338 by Kiana Aragon RN  Pain Management Interventions: pillow support provided  Taken 4/25/2025 0200 by Kiana Aragon RN  Pain Management Interventions: pillow support provided  Taken 4/25/2025 0000 by Kiana Aragon RN  Pain Management Interventions: pillow support provided  Taken 4/24/2025 2251 by Kiana Aragon RN  Pain Management Interventions: pillow support provided  Taken 4/24/2025 1917 by Kiana Aragon RN  Pain Management Interventions: pillow support provided  Intervention: Provide Person-Centered Care  Recent Flowsheet Documentation  Taken 4/25/2025 0338 by Kiana Aragon RN  Trust Relationship/Rapport:   care explained   thoughts/feelings acknowledged  Taken 4/25/2025 0200 by Kiana Aragon RN  Trust Relationship/Rapport:   care explained   thoughts/feelings acknowledged  Taken 4/25/2025 0000 by Kiana Aragon RN  Trust Relationship/Rapport:   care explained   thoughts/feelings acknowledged  Taken 4/24/2025 2251 by Kiana Aragon RN  Trust Relationship/Rapport:   care explained   thoughts/feelings acknowledged  Taken 4/24/2025 1917 by Kiana Aragon RN  Trust Relationship/Rapport:   care explained   thoughts/feelings acknowledged  Goal: Readiness for Transition of Care  Outcome: Progressing     Problem: Skin Injury Risk Increased  Goal: Skin Health and Integrity  Outcome: Progressing  Intervention: Optimize Skin Protection  Recent Flowsheet Documentation  Taken 4/25/2025 0338 by Kiana Aragon RN  Activity Management: activity encouraged  Pressure Reduction Techniques: frequent weight shift encouraged  Head of Bed (HOB) Positioning: HOB at 20-30 degrees  Pressure Reduction Devices: pressure-redistributing mattress utilized  Skin Protection: incontinence pads  utilized  Taken 4/25/2025 0337 by Kiana Aragon RN  Activity Management:   ambulated in room   ambulated to bathroom  Taken 4/25/2025 0200 by Kiana Aragon RN  Activity Management: activity encouraged  Pressure Reduction Techniques: frequent weight shift encouraged  Head of Bed (HOB) Positioning: HOB at 20-30 degrees  Pressure Reduction Devices: pressure-redistributing mattress utilized  Skin Protection: incontinence pads utilized  Taken 4/25/2025 0000 by Kiana Aragon RN  Activity Management: activity encouraged  Pressure Reduction Techniques: frequent weight shift encouraged  Head of Bed (HOB) Positioning: HOB at 20-30 degrees  Pressure Reduction Devices: pressure-redistributing mattress utilized  Skin Protection: incontinence pads utilized  Taken 4/24/2025 2251 by Kiana Aragon RN  Pressure Reduction Techniques: frequent weight shift encouraged  Pressure Reduction Devices: pressure-redistributing mattress utilized  Skin Protection: incontinence pads utilized  Taken 4/24/2025 1917 by Kiana Aragon RN  Pressure Reduction Techniques: frequent weight shift encouraged  Pressure Reduction Devices: pressure-redistributing mattress utilized  Skin Protection: incontinence pads utilized  Intervention: Promote and Optimize Oral Intake  Recent Flowsheet Documentation  Taken 4/25/2025 0338 by Kiana Aragon RN  Oral Nutrition Promotion: rest periods promoted  Taken 4/25/2025 0200 by Kiana Aragon RN  Oral Nutrition Promotion: rest periods promoted  Taken 4/25/2025 0000 by Kiana Aragon RN  Oral Nutrition Promotion: rest periods promoted  Taken 4/24/2025 2251 by Kiana Aragon RN  Oral Nutrition Promotion: rest periods promoted  Taken 4/24/2025 1917 by Kiana Aragon RN  Oral Nutrition Promotion: rest periods promoted     Problem: Comorbidity Management  Goal: Blood Pressure in Desired Range  Outcome: Progressing     Problem: Shoulder Arthroplasty (Total, Geovanny, Reverse)  Goal: Nausea and Vomiting Relief  Outcome: Not  Progressing  Intervention: Prevent or Manage Nausea and Vomiting  Recent Flowsheet Documentation  Taken 4/25/2025 0338 by Kiana Aragon, RN  Nausea/Vomiting Interventions: medication given  Taken 4/25/2025 0200 by Kiana Aragon RN  Nausea/Vomiting Interventions: medication given  Taken 4/25/2025 0000 by Kiana Aragon, RN  Nausea/Vomiting Interventions: medication given  Taken 4/24/2025 2251 by Kiana Aragon RN  Nausea/Vomiting Interventions: medication given   Goal Outcome Evaluation:

## 2025-04-25 NOTE — CASE MANAGEMENT/SOCIAL WORK
Continued Stay Note  [facility]     Patient Name: Priscilla Purdy  MRN: [id number]  Today's Date: 4/25/2025    Admit Date: 4/24/2025    Plan: home   Discharge Plan       Row Name 04/25/25 0932       Plan    Plan home    Patient/Family in Agreement with Plan yes    Plan Comments Pt lives in [facility] with her . She reports she was independent with ADLs and mobility prior to admit. Denies use of any DME. Pt is followed by her PCP and has drug coverage. At this time her plan for discharge is to return home with  transporting. CM to follow for any dc needs    Final Discharge Disposition Code 01 - home or self-care                   Discharge Codes    No documentation.                       Divine Martinez RN

## 2025-04-25 NOTE — THERAPY EVALUATION
Patient Name: Priscilla Purdy  : 1945    MRN: [id number]                              Today's Date: 2025       Admit Date: 2025    Visit Dx: No diagnosis found.  Patient Active Problem List   Diagnosis    Pain in thoracic spine    Osteoarthritis of thoracolumbar spine    Rotator cuff arthropathy of right shoulder    Rotator cuff arthropathy    HTN (hypertension)    Hyperlipidemia    COPD (chronic obstructive pulmonary disease)    CAD (coronary artery disease), s/p stents    Status post reverse total replacement of right shoulder    Anticardiolipin syndrome     Past Medical History:   Diagnosis Date    Anticardiolipin syndrome     CHF (congestive heart failure)     Cochlear implant in place     right    COPD (chronic obstructive pulmonary disease)     Coronary artery disease     MAGDIEL x2 (), MAGDIEL x3 ()    Disease of thyroid gland     hypothyroid    DVT (deep venous thrombosis)     Hyperlipidemia     Hypertension     MI (myocardial infarction)     Raynaud's disease      Past Surgical History:   Procedure Laterality Date    CHOLECYSTECTOMY      COLONOSCOPY      CORONARY ANGIOPLASTY WITH STENT PLACEMENT      MAGDIEL x 2    CORONARY ANGIOPLASTY WITH STENT PLACEMENT      MAGDIEL x3    EAR MASTOIDECTOMY W/ COCHLEAR IMPLANT W/ LANDMARK Right     ENDOMETRIAL ABLATION      LEG SURGERY Right     x2    OVARIAN CYST REMOVAL      TOTAL SHOULDER ARTHROPLASTY W/ DISTAL CLAVICLE EXCISION Right 2025    Procedure: REVERSE TOTAL SHOULDER ARTHROPLASTY RIGHT;  Surgeon: Norbert Delcid MD;  Location: [facility];  Service: Orthopedics;  Laterality: Right;    TUBAL ABDOMINAL LIGATION        General Information       Row Name 25 1312          Physical Therapy Time and Intention    Document Type evaluation  -AC     Mode of Treatment physical therapy;individual therapy  -AC       Row Name 25 1312          General Information    Patient Profile Reviewed yes  -AC     Prior Level of Function  independent:;all household mobility;community mobility;gait;transfer;bed mobility;ADL's  -     Existing Precautions/Restrictions fall;non-weight bearing;shoulder;right  Don[place] II with pillow  -     Barriers to Rehab none identified  -AC       Row Name 04/25/25 1312          Living Environment    Current Living Arrangements home  -     People in Home spouse  -AC       Row Name 04/25/25 1312          Home Main Entrance    Number of Stairs, Main Entrance one  -AC     Stair Railings, Main Entrance none  -AC       Row Name 04/25/25 1312          Stairs Within Home, Primary    Number of Stairs, Within Home, Primary none  -AC       Row Name 04/25/25 1312          Cognition    Orientation Status (Cognition) oriented x 4  -AC       Row Name 04/25/25 1312          Safety Issues/Impairments Affecting Functional Mobility    Safety Issues Affecting Function (Mobility) safety precautions follow-through/compliance;safety precaution awareness  -     Impairments Affecting Function (Mobility) endurance/activity tolerance;motor control;range of motion (ROM);sensation/sensory awareness;strength  -               User Key  (r) = Recorded By, (t) = Taken By, (c) = Cosigned By      Initials Name Provider Type    AC Tuh Valle, PT Physical Therapist                   Mobility       Row Name 04/25/25 1312          Bed Mobility    Supine-Sit [place] (Bed Mobility) standby assist;1 person assist;verbal cues;nonverbal cues (demo/gesture)  -     Assistive Device (Bed Mobility) head of bed elevated  -     Comment, (Bed Mobility) PT re-eduated pt on NWB precautions and protecting R arm  -       Row Name 04/25/25 1312          Transfers    Comment, (Transfers) VC for hand placement and sequencing  -       Row Name 04/25/25 1312          Sit-Stand Transfer    Sit-Stand [place] (Transfers) contact guard;1 person assist;verbal cues;nonverbal cues (demo/gesture)  -     Assistive Device (Sit-Stand Transfers) cane, straight   -AC     Comment, (Sit-Stand Transfer) STS 4x, 1x from commode, 2x from EOB, 1x therapy mat  -AC       Row Name 04/25/25 1312          Gait/Stairs (Locomotion)    [place] Level (Gait) contact guard;1 person assist;verbal cues;nonverbal cues (demo/gesture)  -AC     Assistive Device (Gait) cane, straight  -AC     Patient was able to Ambulate yes  -AC     Distance in Feet (Gait) 200  -AC     Deviations/Abnormal Patterns (Gait) callum decreased;gait speed decreased;base of support, narrow  -AC     Bilateral Gait Deviations heel strike decreased  -AC     Assistive Device (Stairs) cane, straight  -AC     Handrail Location (Stairs) none  -AC     Number of Steps (Stairs) 1, 2x  -AC     Ascending Technique (Stairs) step-to-step  -AC     Descending Technique (Stairs) step-to-step  -AC     Comment, (Gait/Stairs) Pt ambulated into hallway w/ CGAx1, SPC, and a step through gait pattern however pt deasatted to 80% requiring 2L NC to recover to >90%. Pt demonstrated slight unsteadiness while ambulating however no overt LOB. In addition pt required VC for sequencing w/ cane and safety awareness of RUE. Pt completed 1 step 2x w/ CGAx1 and VC for sequencing w / cane.  -       Row Name 04/25/25 1312          Mobility    Extremity Weight-bearing Status right upper extremity  -     Right Upper Extremity (Weight-bearing Status) non weight-bearing (NWB)  -               User Key  (r) = Recorded By, (t) = Taken By, (c) = Cosigned By      Initials Name Provider Type    AC Thu Valle, PT Physical Therapist                   Obj/Interventions       Row Name 04/25/25 1321          Range of Motion Comprehensive    General Range of Motion bilateral lower extremity ROM WNL  -       Row Name 04/25/25 1321          Strength Comprehensive (MMT)    General Manual Muscle Testing (MMT) Assessment lower extremity strength deficits identified  -     Comment, General Manual Muscle Testing (MMT) Assessment BLE's grossly WFL  -       Row  Name 04/25/25 1321          Balance    Balance Assessment sitting static balance;sitting dynamic balance;standing static balance;standing dynamic balance  -AC     Static Sitting Balance supervision  -AC     Dynamic Sitting Balance supervision  -AC     Position, Sitting Balance unsupported;sitting edge of bed  -AC     Static Standing Balance contact guard  -AC     Dynamic Standing Balance contact guard  -AC     Position/Device Used, Standing Balance supported;cane, straight  -AC     Balance Interventions sitting;standing;sit to stand;supported;static;dynamic  -AC       Row Name 04/25/25 1321          Sensory Assessment (Somatosensory)    Sensory Assessment (Somatosensory) LE sensation intact  -AC               User Key  (r) = Recorded By, (t) = Taken By, (c) = Cosigned By      Initials Name Provider Type    AC Thu Valle, PT Physical Therapist                   Goals/Plan       Row Name 04/25/25 1334          Bed Mobility Goal 1 (PT)    Activity/Assistive Device (Bed Mobility Goal 1, PT) supine to sit  -AC     [place] Level/Cues Needed (Bed Mobility Goal 1, PT) independent  -AC     Time Frame (Bed Mobility Goal 1, PT) short term goal (STG);5 days  -       Row Name 04/25/25 1334          Transfer Goal 1 (PT)    Activity/Assistive Device (Transfer Goal 1, PT) sit-to-stand/stand-to-sit  -AC     [place] Level/Cues Needed (Transfer Goal 1, PT) modified independence  -AC     Time Frame (Transfer Goal 1, PT) short term goal (STG);5 days  -       Row Name 04/25/25 1333          Gait Training Goal 1 (PT)    Activity/Assistive Device (Gait Training Goal 1, PT) gait (walking locomotion);decrease fall risk;improve balance and speed;increase endurance/gait distance  -AC     [place] Level (Gait Training Goal 1, PT) modified independence  -AC     Distance (Gait Training Goal 1, PT) 200  -AC     Time Frame (Gait Training Goal 1, PT) long term goal (LTG);10 days  -       Row Name 04/25/25 3182          Therapy  Assessment/Plan (PT)    Planned Therapy Interventions (PT) balance training;bed mobility training;gait training;home exercise program;patient/family education;stair training;strengthening;transfer training;ROM (range of motion)  -               User Key  (r) = Recorded By, (t) = Taken By, (c) = Cosigned By      Initials Name Provider Type    AC Thu Valle, PT Physical Therapist                   Clinical Impression       Row Name 04/25/25 1321          Pain    Posttreatment Pain Rating 3/10  -     Pain Location shoulder  -     Pain Management Interventions activity modification encouraged;exercise or physical activity utilized;positioning techniques utilized  -     Response to Pain Interventions activity participation with tolerable pain  -       Row Name 04/25/25 1321          Plan of Care Review    Plan of Care Reviewed With patient;spouse  -     Progress no change  -     Outcome Evaluation PT initial evaluation complete. Pt presents w/ generalized weakness, decreased activity tolerance, and impaired walking balance. Pt ambulated throughout hallway w/ CGAx1 and SPC however required 1L NC due to O2 dropping to 80% w/ ambulation. In addition pt required VC for safety and object navigation during ambulation. Pt completed stair training w / CGAx1, SPC, and no LOB. IPPT services warranted to address deficits listed above. D/c rec is home w/ 24/7 assist and HHPT for best outcome.  -       Row Name 04/25/25 1321          Therapy Assessment/Plan (PT)    Rehab Potential (PT) good  -     Criteria for Skilled Interventions Met (PT) yes  -AC     Therapy Frequency (PT) daily  -     Predicted Duration of Therapy Intervention (PT) 10 days  -       Row Name 04/25/25 1321          Vital Signs    Pre SpO2 (%) 91  -AC     O2 Delivery Pre Treatment room air  -AC     Intra SpO2 (%) 80  -AC     O2 Delivery Intra Treatment nasal cannula  -AC     Post SpO2 (%) 95  -AC     O2 Delivery Post Treatment nasal cannula   -AC     Pre Patient Position Supine  -AC     Intra Patient Position Standing  -AC     Post Patient Position Sitting  -AC       Row Name 04/25/25 1321          Positioning and Restraints    Pre-Treatment Position in bed  -AC     Post Treatment Position chair  -AC     In Chair notified nsg;reclined;sitting;call light within reach;encouraged to call for assist;exit alarm on;waffle cushion;legs elevated  -               User Key  (r) = Recorded By, (t) = Taken By, (c) = Cosigned By      Initials Name Provider Type     Thu Valle PT Physical Therapist                   Outcome Measures       Row Name 04/25/25 1334 04/25/25 0909       How much help from another person do you currently need...    Turning from your back to your side while in flat bed without using bedrails? 3  -AC 3  -GS    Moving from lying on back to sitting on the side of a flat bed without bedrails? 3  -AC 3  -GS    Moving to and from a bed to a chair (including a wheelchair)? 3  -AC 3  -GS    Standing up from a chair using your arms (e.g., wheelchair, bedside chair)? 3  -AC 3  -GS    Climbing 3-5 steps with a railing? 3  -AC 2  -GS    To walk in hospital room? 3  -AC 3  -GS    AM-PAC 6 Clicks Score (PT) 18  -AC 17  -GS    Highest Level of Mobility Goal 6 --> Walk 10 steps or more  -AC 5 --> Static standing  -GS      Row Name 04/25/25 1334          Functional Assessment    Outcome Measure Options AM-PAC 6 Clicks Basic Mobility (PT)  -               User Key  (r) = Recorded By, (t) = Taken By, (c) = Cosigned By      Initials Name Provider Type     Mili Paniagua RN Registered Nurse    Thu Dawkins, PT Physical Therapist                                 Physical Therapy Education       Title: PT OT SLP Therapies (Done)       Topic: Physical Therapy (Done)       Point: Mobility training (Done)       Learning Progress Summary            Patient Acceptance, E, VU by  at 4/25/2025 1335                      Point: Home exercise program (Done)        Learning Progress Summary            Patient Acceptance, E, VU by  at 4/25/2025 1335                      Point: Body mechanics (Done)       Learning Progress Summary            Patient Acceptance, E, VU by  at 4/25/2025 1335                      Point: Precautions (Done)       Learning Progress Summary            Patient Acceptance, E, VU by  at 4/25/2025 1335                                      User Key       Initials Effective Dates Name Provider Type Discipline     07/11/24 -  Thu Valle, PT Physical Therapist PT                  PT Recommendation and Plan  Planned Therapy Interventions (PT): balance training, bed mobility training, gait training, home exercise program, patient/family education, stair training, strengthening, transfer training, ROM (range of motion)  Progress: no change  Outcome Evaluation: PT initial evaluation complete. Pt presents w/ generalized weakness, decreased activity tolerance, and impaired walking balance. Pt ambulated throughout hallway w/ CGAx1 and SPC however required 1L NC due to O2 dropping to 80% w/ ambulation. In addition pt required VC for safety and object navigation during ambulation. Pt completed stair training w / CGAx1, SPC, and no LOB. IPPT services warranted to address deficits listed above. D/c rec is home w/ 24/7 assist and HHPT for best outcome.     Time Calculation:   PT Evaluation Complexity  History, PT Evaluation Complexity: 1-2 personal factors and/or comorbidities  Examination of Body Systems (PT Eval Complexity): total of 3 or more elements  Clinical Presentation (PT Evaluation Complexity): stable  Clinical Decision Making (PT Evaluation Complexity): low complexity  Overall Complexity (PT Evaluation Complexity): low complexity     PT Charges       Row Name 04/25/25 1336             Time Calculation    Start Time 1040  -AC      PT Received On 04/25/25  [facility]      PT Goal Re-Cert Due Date 05/05/25  -         Time Calculation- PT    Total Timed  Code Minutes- PT 12 minute(s)  -AC         Timed Charges    99947 - Gait Training Minutes  12  -AC         Untimed Charges    PT Eval/Re-eval Minutes 49  -AC         Total Minutes    Timed Charges Total Minutes 12  -AC      Untimed Charges Total Minutes 49  -AC       Total Minutes 61  -AC                User Key  (r) = Recorded By, (t) = Taken By, (c) = Cosigned By      Initials Name Provider Type    AC Thu Valle, PT Physical Therapist                  Therapy Charges for Today       Code Description Service Date Service Provider Modifiers Qty    21253573525 HC GAIT TRAINING EA 15 MIN 4/25/2025 Thu Valle, PT GP 1    71216708755 HC PT EVAL LOW COMPLEXITY 4 4/25/2025 Thu Valle, PT GP 1            PT G-Codes  Outcome Measure Options: AM-PAC 6 Clicks Basic Mobility (PT)  AM-PAC 6 Clicks Score (PT): 18  AM-PAC 6 Clicks Score (OT): 14  PT Discharge Summary  Anticipated Discharge Disposition (PT): home with 24/7 care, home with home health    Thu Valle PT  4/25/2025

## 2025-04-25 NOTE — DISCHARGE INSTRUCTIONS
Nerve Catheter Removal Instructions  When your device is empty:    Remove your catheter by pulling the dressing off slowly (like you would remove a regular bandage). The catheter should pull right out of the skin.  Check that the BLUE tip is intact.                                                                                     If the catheter is stuck, reposition your   extremity and pull slowly until removed.  *If catheter is HURTING and WON'T come out, stop and call [phone] for further assistance.    Remove medication bag from the black carrying case.  Cut the tubing on right and left side of pump, and discard the medication bag and tubing into garbage.  Place the pump and black carrying case into the plastic bag and then place this into the return box.  Seal box with blue stickers and return to [employer] postal [employer]. THIS IS PRE-PAID POSTAGE.

## 2025-04-25 NOTE — PLAN OF CARE
Goal Outcome Evaluation:  Plan of Care Reviewed With: patient, spouse        Progress: no change  Outcome Evaluation: PT initial evaluation complete. Pt presents w/ generalized weakness, decreased activity tolerance, and impaired walking balance. Pt ambulated throughout hallway w/ CGAx1 and SPC however required 1L NC due to O2 dropping to 80% w/ ambulation. In addition pt required VC for safety and object navigation during ambulation. Pt completed stair training w / CGAx1, SPC, and no LOB. IPPT services warranted to address deficits listed above. D/c rec is home w/ 24/7 assist and HHPT for best outcome.    Anticipated Discharge Disposition (PT): home with 24/7 care, home with home health

## 2025-04-25 NOTE — PROGRESS NOTES
Pharmacy Consult - Warfarin Dosing    Priscilla Purdy is a 80 y.o. female receiving warfarin therapy.    Consulting Provider: Dr. Drummond  Indication: History of DVT  Goal INR: 2 - 3  Home Regimen: 4 mg daily   Bridge Therapy: Yes, therapeutic enoxaparin     Drug-Drug Interactions with current regimen:  Fioricet and warfarin (Category D interaction)-  Barbiturates may increase the metabolism of Vitamin K Antagonists.   Warfarin Dosing During Admission:    Date  4/24 4/25          INR  0.97 1.12          Dose  6 mg (6 mg)               Discharge Follow-Up:     Outpatient Following Provider - Dr. East     Follow-Up Recommendation - 1 week post discharge    Labs:  Results from last 7 days   Lab Units 04/25/25  0834 04/25/25  0833 04/24/25  1808 04/24/25  1125   INR  1.12  --  0.99 0.97   HEMOGLOBIN g/dL  --  9.9*  --   --    HEMATOCRIT %  --  31.0*  --   --      Results from last 7 days   Lab Units 04/25/25  0833 04/24/25  1125   SODIUM mmol/L 144  --    POTASSIUM mmol/L 3.4* 3.9   CHLORIDE mmol/L 111*  --    CO2 mmol/L 21.0*  --    BUN mg/dL 9  --    CREATININE mg/dL 1.05*  --    CALCIUM mg/dL 7.7*  --    GLUCOSE mg/dL 83  --      Current dietary intake:    Dietary Orders (From admission, onward)       Start     Ordered    04/24/25 1537  Diet: Diabetic; Consistent Carbohydrate; Fluid Consistency: Thin (IDDSI 0)  Diet Effective Now        References:    Diet Order Definitions   Question Answer Comment   Diets: Diabetic    Diabetic Diet: Consistent Carbohydrate    Fluid Consistency: Thin (IDDSI 0)        04/24/25 1536                      Assessment/Plan:  Patient's INR is subtherapeutic at 1.12 today, increased from 0.97.  Plan to give boosted dose of warfarin 6 mg tonight. Continue enoxaparin bridge until INR >2.  Daily PT/INR ordered.  Monitor signs/symptoms of bleeding, dietary intake, and drug-drug interactions. Make dose adjustments as necessary.  Pharmacy will continue to follow.       Ronit Marinelli,  PharmD  4/25/2025  11:12 EDT

## 2025-04-25 NOTE — DISCHARGE SUMMARY
Patient Name: Priscilla Purdy  MRN: [id number]  : 1945  DOS: 2025    Attending: No att. providers found    Primary Care Provider: Teddy East MD    Date of Admission:.2025 10:57 AM    Date of Discharge:  2025    Discharge Diagnosis:   Status post reverse total replacement of right shoulder    Rotator cuff arthropathy of right shoulder    Rotator cuff arthropathy    HTN (hypertension)    Hyperlipidemia    COPD (chronic obstructive pulmonary disease)    CAD (coronary artery disease), s/p stents    Anticardiolipin syndrome      Hospital Course    At admit:  Patient is a pleasant 80 y.o. female presented for scheduled surgery by Dr. Delcid.      She had a fall in 2024 and landed on her right shoulder.  Since then she has had pain and limited range of motion.  She reports some numbness in her right hand and difficulty with her .      She underwent right reverse total shoulder arthroplasty under GA and a block, tolerated surgery well, and was admitted for further management.     Seen in her room postop, doing fairly well, no complaints of nausea, vomiting, or shortness of breath.     Reviewed with patient and her  her past medical history including hypertension, diabetes, COPD, congestive heart failure, coronary artery disease with prior stents, history of DVT and chronic anticoagulation with history of anticardiolipin syndrome.    After admit:  Patient was provided pain medications as needed for pain control, along with interscalene nerve block infusion of Ropivacaine    Adjustments were made to pain medications to optimize postop pain management.   Risks and benefits of opiate medications discussed with patient.     The patient was seen by OT and was taught exercises for her arm.  The patient used an IS for atelectasis prophylaxis and mechanicals for DVT prophylaxis.    Home medications were resumed as appropriate, and labs were monitored and remained fairly stable.      With the progress she has made, she is ready for DC home today.      The patient will have an Infupump ( instructed on it during this admit)  Discussed with patient regarding plan and she shows understanding and agreement.      Pain script sent by Dr. Delcid to patient's home pharmacy.    Procedures Performed  Procedure(s):  REVERSE TOTAL SHOULDER ARTHROPLASTY RIGHT       Pertinent Test Results:    I reviewed the patient's new clinical results.   Results from last 7 days   Lab Units 04/25/25  0833   WBC 10*3/mm3 5.20   HEMOGLOBIN g/dL 9.9*   HEMATOCRIT % 31.0*   PLATELETS 10*3/mm3 144     Results from last 7 days   Lab Units 04/25/25  0833 04/24/25  1125   SODIUM mmol/L 144  --    POTASSIUM mmol/L 3.4* 3.9   CHLORIDE mmol/L 111*  --    CO2 mmol/L 21.0*  --    BUN mg/dL 9  --    CREATININE mg/dL 1.05*  --    CALCIUM mg/dL 7.7*  --    GLUCOSE mg/dL 83  --      I reviewed the patient's new imaging including images and reports.      Occupational Therapy  Progress: improving  Outcome Evaluation: Pt is A/Ox4 and motivated to work with therapy. Education reviewed for R shoulder precautions, sling teaching, and ADL retraining. Pt needs Mod A UB ADLs and sling mgmt. RUE HEP complete with AROM @ hand, wrist, and elbow only. Spouse present and participating in all teaching. Pt is up in room and transfering with CGAx1. No LOB. Pt's thick gel manicure and cold fingers appear to disrupt pulse oximeter reading, monitor moved to toe and pt sats 93-96%. Pt reports good pain control. OT will d/c at this time. Recommend home with 24/7 assist from spouse when medically ready.    Physical therapy  Progress: improving  Outcome Evaluation: Pt continues to present below baseline function d/t RUE NWB, generalized weakness, balance deficits, and decreased activity tolerance. Pt ambulated 180 ft w/ SPC, CGA. Pt ambulated on RA w/ SpO2 91% after ambulation. Pt would continue to benefit from IP PT services while hospitalized. Recommend  "home w/  assist and HHPT once medically appropriate.     Anticipated Discharge Disposition (PT): home with  care, home with home health    Discharge Assessment:       Visit Vitals  /59 (BP Location: Left arm, Patient Position: Sitting)   Pulse 75   Temp 98.3 °F (36.8 °C) (Oral)   Resp 16   Ht 162.6 cm (64\")   Wt 57.6 kg (127 lb)   SpO2 91%   BMI 21.80 kg/m²     Temp (24hrs), Av.6 °F (37 °C), Min:98.1 °F (36.7 °C), Max:99.2 °F (37.3 °C)      General Appearance:    Alert, cooperative, in no acute distress   Lungs:     Clear to auscultation,respirations regular, even and   unlabored    Heart:    Regular rhythm and normal rate, normal S1 and S2    Abdomen:     Normal bowel sounds, no masses, no organomegaly, soft        nontender, nondistended, no guarding, no rebound                 tenderness   Extremities:   RUE in a sling, CDI dressing over right shoulder incision . Interscalene nerve block cath present.  Distal pulses, cap refill,  intact. Movement and sensation intact.     Pulses:   Pulses palpable and equal bilaterally   Skin:   No bleeding, bruising or rash        Discharge Disposition: Home          Discharge Medications        New Medications        Instructions Start Date   ropivacaine 0.2 % infusion (INFUSYSTEM)  Commonly known as: NAROPIN   2 mL/hr (4 mg/hr), Peripheral Nerve, Continuous             Changes to Medications        Instructions Start Date   enoxaparin sodium 60 MG/0.6ML solution prefilled syringe syringe  Commonly known as: LOVENOX  What changed:   medication strength  how much to take  when to take this   1 mg/kg (60 mg), Subcutaneous, Every 12 Hours Scheduled             Continue These Medications        Instructions Start Date   BEET ROOT PO   1 tablet/day, Daily      butalbital-acetaminophen-caffeine -40 MG per tablet  Commonly known as: FIORICET, ESGIC   Take 1 tablet by mouth Every 6 (Six) Hours As Needed for Headache.      clopidogrel 75 MG tablet  Commonly known " as: PLAVIX   75 mg, Oral, Daily, Dr. Delcid office to contact patient regarding holding instructions      coenzyme Q10 100 MG capsule   100 mg, Daily      Coumadin 4 MG tablet  Generic drug: warfarin   4 mg, 3 Times Weekly PRN      warfarin 2 MG tablet  Commonly known as: COUMADIN   2 mg, As Needed      warfarin 4 MG tablet  Commonly known as: COUMADIN   4 mg, Oral, Daily, Dr. Delcid office prior holding instructions prior to surgery      dicyclomine 10 MG capsule  Commonly known as: BENTYL   10 mg, As Needed      estrogens (conjugated) 0.625 MG tablet  Commonly known as: PREMARIN   0.625 mg, Daily      FLAXSEED OIL PO   1 tablet, Daily      fluticasone 50 MCG/ACT nasal spray  Commonly known as: FLONASE   2 sprays, Daily      furosemide 20 MG tablet  Commonly known as: LASIX   10 mg, As Needed      Jardiance 10 MG tablet tablet  Generic drug: empagliflozin   1 tablet, Daily      Lecithin 1200 MG capsule   1 tablet, Daily      levothyroxine 88 MCG tablet  Commonly known as: SYNTHROID, LEVOTHROID   1 tablet, Daily      lidocaine 5 %  Commonly known as: LIDODERM   1 patch, Transdermal, Every 24 Hours, Remove & Discard patch within 12 hours or as directed by MD      lisinopril 20 MG tablet  Commonly known as: PRINIVIL,ZESTRIL   20 mg, 2 Times Daily      nitroglycerin 0.4 MG SL tablet  Commonly known as: NITROSTAT   0.4 mg, Every 5 Minutes PRN      NON FORMULARY   1 tablet, Daily      ondansetron 4 MG tablet  Commonly known as: ZOFRAN   Take 1 tablet by mouth Every 8 (Eight) Hours As Needed for post op nausea      pantoprazole 40 MG EC tablet  Commonly known as: PROTONIX   40 mg, Daily      Repatha SureClick solution auto-injector SureClick injection  Generic drug: Evolocumab   140 mg, Every 14 Days      rosuvastatin 5 MG tablet  Commonly known as: CRESTOR   5 mg, 3 Times Weekly      sertraline 25 MG tablet  Commonly known as: ZOLOFT   12.5 mg, Daily      tiotropium bromide-olodaterol 2.5-2.5 MCG/ACT aerosol solution  inhaler  Commonly known as: STIOLTO RESPIMAT   2 puffs, Inhalation, Daily      Toprol XL 50 MG 24 hr tablet  Generic drug: metoprolol succinate XL   50 mg, Daily      vitamin B-12 1000 MCG tablet  Commonly known as: CYANOCOBALAMIN   1,000 mcg, Daily      Vitamin C 500 MG capsule   1 tablet, Daily      Vitamin D3 25 MCG (1000 UT) capsule   1,000 Units, Daily      vitamin E 400 UNIT capsule   400 Units, Daily             Stop These Medications      benzoyl peroxide 5 % external wash  Generic drug: benzoyl peroxide     mupirocin 2 % ointment  Commonly known as: BACTROBAN              Discharge Diet:   Diet Instructions    Regular           Activity at Discharge:   Activity Instructions    As tolerated...No weight bearing with right arm/shoulder.         NWB RUE, ROM elbow, wrist, and hand per Dr. Delcid's instructions.    Follow-up Appointments:  Future Appointments   Date Time Provider Department Center   11/18/2025  1:00 PM Zita Orona APRN [facility]   Follow up with Dr. Delcid per his recommendations.       Discharge took less than 30 minutes.       CIPRIANO Gamboa  04/26/25  22:18 EDT

## 2025-04-25 NOTE — THERAPY DISCHARGE NOTE
Acute Care - Occupational Therapy Discharge  [facility]    Patient Name: Priscilla Purdy  : 1945    MRN: [id number]                              Today's Date: 2025       Admit Date: 2025    Visit Dx: No diagnosis found.  Patient Active Problem List   Diagnosis    Pain in thoracic spine    Osteoarthritis of thoracolumbar spine    Rotator cuff arthropathy of right shoulder    Rotator cuff arthropathy    HTN (hypertension)    Hyperlipidemia    COPD (chronic obstructive pulmonary disease)    CAD (coronary artery disease), s/p stents    Status post reverse total replacement of right shoulder    Anticardiolipin syndrome     Past Medical History:   Diagnosis Date    Anticardiolipin syndrome     CHF (congestive heart failure)     Cochlear implant in place     right    COPD (chronic obstructive pulmonary disease)     Coronary artery disease     MAGDIEL x2 (), MAGDIEL x3 ()    Disease of thyroid gland     hypothyroid    DVT (deep venous thrombosis)     Hyperlipidemia     Hypertension     MI (myocardial infarction)     Raynaud's disease      Past Surgical History:   Procedure Laterality Date    CHOLECYSTECTOMY      COLONOSCOPY      CORONARY ANGIOPLASTY WITH STENT PLACEMENT      MAGDIEL x 2    CORONARY ANGIOPLASTY WITH STENT PLACEMENT      MAGDIEL x3    EAR MASTOIDECTOMY W/ COCHLEAR IMPLANT W/ LANDMARK Right     ENDOMETRIAL ABLATION      LEG SURGERY Right     x2    OVARIAN CYST REMOVAL      TOTAL SHOULDER ARTHROPLASTY W/ DISTAL CLAVICLE EXCISION Right 2025    Procedure: REVERSE TOTAL SHOULDER ARTHROPLASTY RIGHT;  Surgeon: Norbert Delcid MD;  Location: [facility];  Service: Orthopedics;  Laterality: Right;    TUBAL ABDOMINAL LIGATION        General Information       Row Name 25 1506          OT Time and Intention    Subjective Information no complaints  -TB     Document Type therapy note (daily note)  -TB     Mode of Treatment occupational therapy;individual therapy  -TB     Patient Effort  good  -TB     Symptoms Noted During/After Treatment none  -TB       Row Name 04/25/25 1506          General Information    Patient Profile Reviewed yes  -TB     Existing Precautions/Restrictions right;shoulder;non-weight bearing;other (see comments)  s/p R rTSA with NWB precautions, [place] with abduction support  -TB     Barriers to Rehab none identified  -TB       Row Name 04/25/25 1506          Occupational Profile    Reason for Services/Referral (Occupational Profile) Occupational decline  -TB       Row Name 04/25/25 1506          Cognition    Orientation Status (Cognition) oriented x 4  -TB       Row Name 04/25/25 1506          Safety Issues/Impairments Affecting Functional Mobility    Safety Issues Affecting Function (Mobility) safety precautions follow-through/compliance;awareness of need for assistance  -TB     Impairments Affecting Function (Mobility) balance;endurance/activity tolerance;pain;strength;range of motion (ROM)  -TB               User Key  (r) = Recorded By, (t) = Taken By, (c) = Cosigned By      Initials Name Provider Type    TB Lawanda Naranjo OT Occupational Therapist                   Mobility/ADL's       Row Name 04/25/25 1508          Bed Mobility    Comment, (Bed Mobility) UIC  -TB       Row Name 04/25/25 1508          Transfers    Transfers sit-stand transfer;stand-sit transfer  -TB     Comment, (Transfers) Education and cues for hand placement  -TB       Row Name 04/25/25 1508          Sit-Stand Transfer    Sit-Stand [place] (Transfers) standby assist;verbal cues  -TB       Row Name 04/25/25 1508          Stand-Sit Transfer    Stand-Sit [place] (Transfers) standby assist;verbal cues  -TB       Row Name 04/25/25 1508          Functional Mobility    Functional Mobility- Ind. Level contact guard assist;1 person;verbal cues required  -TB     Functional Mobility- Device --  LUE support  -TB     Functional Mobility-Maintain WBing cues to maintain weight bearing status  -TB      Functional Mobility- Comment Pt is up in room with good effort. Spouse assists pt to bathroom with LUE support.  -TB     Patient was able to Ambulate yes  -TB       Row Name 04/25/25 1508          Activities of Daily Living    BADL Assessment/Intervention bathing;upper body dressing;lower body dressing  -TB       Row Name 04/25/25 1508          Mobility    Extremity Weight-bearing Status right upper extremity  -TB     Right Upper Extremity (Weight-bearing Status) non weight-bearing (NWB)   -TB       Row Name 04/25/25 1508          Upper Body Dressing Assessment/Training    [place] Level (Upper Body Dressing) doff;pajama/robe;don;bra/undergarment;front opening garment;moderate assist (50% patient effort);verbal cues  Sling mgmt/proper fit  -TB     Position (Upper Body Dressing) unsupported sitting  -TB     Comment, (Upper Body Dressing) Education completed for R shoulder precautions, sling teaching, and ADL retraining. Spouse present and participating in all teaching.  -TB       Row Name 04/25/25 1506          Bathing Assessment/Intervention    [place] Level (Bathing) upper body;bathing skills;moderate assist (50% patient effort);verbal cues  -TB     Position (Bathing) unsupported sitting  -TB     Comment, (Bathing) Education reviewed for R shoulder precautions and ADL retraining to maintain. Spouse demonstrates understanding to care for pt at home.  -TB       Row Name 04/25/25 1509          Lower Body Dressing Assessment/Training    [place] Level (Lower Body Dressing) don;pants/bottoms;socks;shoes/slippers;maximum assist (25% patient effort);verbal cues  -TB     Position (Lower Body Dressing) unsupported sitting;supported standing  -TB               User Key  (r) = Recorded By, (t) = Taken By, (c) = Cosigned By      Initials Name Provider Type    TB Lawanda Naranjo OT Occupational Therapist                   Obj/Interventions       Row Name 04/25/25 1512          Sensory Assessment  (Somatosensory)    Sensory Assessment (Somatosensory) right UE  -TB     Right UE Sensory Assessment general sensation;light touch awareness;light touch localization;intact  -       Row Name 04/25/25 1512          Elbow/Forearm (Therapeutic Exercise)    Elbow/Forearm (Therapeutic Exercise) AROM (active range of motion)  -TB     Elbow/Forearm AROM (Therapeutic Exercise) right;flexion;extension;supination;pronation;sitting;10 repetitions  -       Row Name 04/25/25 1512          Wrist (Therapeutic Exercise)    Wrist (Therapeutic Exercise) AROM (active range of motion)  -TB     Wrist AROM (Therapeutic Exercise) right;flexion;extension;10 repetitions  -TB       Row Name 04/25/25 1512          Hand (Therapeutic Exercise)    Hand (Therapeutic Exercise) AROM (active range of motion)  -TB     Hand AROM/AAROM (Therapeutic Exercise) right;AROM (active range of motion);finger flexion;finger extension;10 repetitions  -       Row Name 04/25/25 1512          Motor Skills    Motor Skills functional endurance  -     Functional Endurance Pt's thick gel manicure and cold fingers appear to disrupt pulse oximeter reading, monitor moved to toe and pt sats 93-96%.   -TB     Therapeutic Exercise hand;wrist;elbow/forearm  Education reviewed for RUE HEP per MD parameters @ hand, wrist, and elbow only. Pt demonstrates good understanding. Spouse present for all teaching.  -       Row Name 04/25/25 1512          Balance    Balance Assessment sitting dynamic balance;sit to stand dynamic balance;standing dynamic balance  -     Dynamic Sitting Balance supervision  -     Position, Sitting Balance unsupported;sitting in chair  -     Sit to Stand Dynamic Balance contact guard;verbal cues;1-person assist  -     Dynamic Standing Balance contact guard;verbal cues  -     Position/Device Used, Standing Balance supported  LUE support  -     Balance Interventions sitting;standing;sit to stand;supported;static;dynamic;dynamic  reaching;occupation based/functional task  -TB     Comment, Balance No LOB  -TB               User Key  (r) = Recorded By, (t) = Taken By, (c) = Cosigned By      Initials Name Provider Type    TB Lawanda Naranjo OT Occupational Therapist                   Goals/Plan    No documentation.                  Clinical Impression       Row Name 04/25/25 1517          Pain Assessment    Pain Location extremity  -TB     Pain Side/Orientation right;upper  Pt indicates soreness in R forearm.  -TB     Pain Management Interventions activity modification encouraged;exercise or physical activity utilized;positioning techniques utilized;cold applied  -TB     Response to Pain Interventions activity participation with tolerable pain;activity level improved  -TB     Additional Documentation Pain Scale: FACES Pre/Post-Treatment (Group)  -TB       Row Name 04/25/25 4737          Pain Scale: FACES Pre/Post-Treatment    Pain: FACES Scale, Pretreatment 2-->hurts little bit  -TB     Posttreatment Pain Rating 4-->hurts little more  -TB     Pre/Posttreatment Pain Comment Pt reports adequate pain control  -TB       Row Name 04/25/25 6747          Plan of Care Review    Plan of Care Reviewed With patient;spouse  -TB     Progress improving  -TB     Outcome Evaluation Pt is A/Ox4 and motivated to work with therapy. Education reviewed for R shoulder precautions, sling teaching, and ADL retraining. Pt needs Mod A UB ADLs and sling mgmt. RUE HEP complete with AROM @ hand, wrist, and elbow only. Spouse present and participating in all teaching. Pt is up in room and transfering with CGAx1. No LOB. Pt's thick gel manicure and cold fingers appear to disrupt pulse oximeter reading, monitor moved to toe and pt sats 93-96%. Pt reports good pain control. OT will d/c at this time. Recommend home with 24/7 assist from spouse when medically ready.  -TB       Row Name 04/25/25 6406          Therapy Plan Review/Discharge Plan (OT)    Anticipated  Discharge Disposition (OT) home with 24/7 care  -TB       Row Name 04/25/25 1517          Vital Signs    Pre Systolic BP Rehab --  RN cleared OT  -TB     Pre SpO2 (%) 96  -TB     O2 Delivery Pre Treatment room air  -TB     Post SpO2 (%) 93  -TB     O2 Delivery Post Treatment room air  -TB     Pre Patient Position Sitting  -TB     Intra Patient Position Standing  -TB     Post Patient Position Sitting  -TB       Row Name 04/25/25 1517          Positioning and Restraints    Pre-Treatment Position sitting in chair/recliner  -TB     Post Treatment Position chair  -TB     In Chair notified nsg;reclined;call light within reach;encouraged to call for assist;with family/caregiver;legs elevated;with brace  -TB               User Key  (r) = Recorded By, (t) = Taken By, (c) = Cosigned By      Initials Name Provider Type    TB Lawanda Naranjo, OT Occupational Therapist                   Outcome Measures       Row Name 04/25/25 1522          How much help from another is currently needed...    Putting on and taking off regular lower body clothing? 2  -TB     Bathing (including washing, rinsing, and drying) 2  -TB     Toileting (which includes using toilet bed pan or urinal) 2  -TB     Putting on and taking off regular upper body clothing 2  -TB     Taking care of personal grooming (such as brushing teeth) 3  -TB     Eating meals 4  -TB     AM-PAC 6 Clicks Score (OT) 15  -TB       Row Name 04/25/25 1334 04/25/25 0909       How much help from another person do you currently need...    Turning from your back to your side while in flat bed without using bedrails? 3  -AC 3  -GS    Moving from lying on back to sitting on the side of a flat bed without bedrails? 3  -AC 3  -GS    Moving to and from a bed to a chair (including a wheelchair)? 3  -AC 3  -GS    Standing up from a chair using your arms (e.g., wheelchair, bedside chair)? 3  -AC 3  -GS    Climbing 3-5 steps with a railing? 3  -AC 2  -GS    To walk in hospital room? 3   -AC 3  -GS    AM-PAC 6 Clicks Score (PT) 18  -AC 17  -GS    Highest Level of Mobility Goal 6 --> Walk 10 steps or more  -AC 5 --> Static standing  -      Row Name 04/25/25 1522 04/25/25 1334       Functional Assessment    Outcome Measure Options AM-PAC 6 Clicks Daily Activity (OT)  -TB AM-PAC 6 Clicks Basic Mobility (PT)  -AC              User Key  (r) = Recorded By, (t) = Taken By, (c) = Cosigned By      Initials Name Provider Type    TB Lawanda Naranjo, OT Occupational Therapist    Mili Cobian RN Registered Nurse    Thu Dawkins, PT Physical Therapist                  Occupational Therapy Education       Title: PT OT SLP Therapies (Done)       Topic: Occupational Therapy (Done)       Point: ADL training (Done)       Learning Progress Summary            Patient Acceptance, E,D, VU,DU,NR by TB at 4/25/2025 1523    Comment: Recommend home with 24/7 assist. Spouse present and participating in all teaching.    JOHN CageTB,D,H, VU by AR at 4/24/2025 1804   Family Acceptance, E,D, VU,DU,NR by TB at 4/25/2025 1523    Comment: Recommend home with 24/7 assist. Spouse present and participating in all teaching.    JOHN Cage,TB,D,H, VU by AR at 4/24/2025 1804                      Point: Home exercise program (Done)       Learning Progress Summary            Patient Acceptance, E,D, VU,DU,NR by TB at 4/25/2025 1523    Comment: Recommend home with 24/7 assist. Spouse present and participating in all teaching.    JOHN Cage,TB,D,H, VU by AR at 4/24/2025 1804   Family Acceptance, E,D, VU,DU,NR by TB at 4/25/2025 1523    Comment: Recommend home with 24/7 assist. Spouse present and participating in all teaching.    JOHN Cage,TB,D,H, VU by AR at 4/24/2025 1804                      Point: Precautions (Done)       Learning Progress Summary            Patient Acceptance, E,D, VU,DU,NR by TB at 4/25/2025 1523    Comment: Recommend home with 24/7 assist. Spouse present and participating in all teaching.    JOHN Cage,TB,D,H,  VU by AR at 4/24/2025 1804   Family Acceptance, E,D, VU,DU,NR by TB at 4/25/2025 1523    Comment: Recommend home with 24/7 assist. Spouse present and participating in all teaching.    Eager, E,TB,D,H, VU by AR at 4/24/2025 1804                      Point: Body mechanics (Done)       Learning Progress Summary            Patient Eager, E,TB,D,H, VU by AR at 4/24/2025 1804   Family Eager, E,TB,D,H, VU by AR at 4/24/2025 1804                                      User Key       Initials Effective Dates Name Provider Type Discipline    TB 07/11/23 -  Lawanda Naranjo, OT Occupational Therapist OT    AR 07/11/23 -  Helen Roayl, OT Occupational Therapist OT                  OT Recommendation and Plan     Plan of Care Review  Plan of Care Reviewed With: patient, spouse  Progress: improving  Outcome Evaluation: Pt is A/Ox4 and motivated to work with therapy. Education reviewed for R shoulder precautions, sling teaching, and ADL retraining. Pt needs Mod A UB ADLs and sling mgmt. RUE HEP complete with AROM @ hand, wrist, and elbow only. Spouse present and participating in all teaching. Pt is up in room and transfering with CGAx1. No LOB. Pt's thick gel manicure and cold fingers appear to disrupt pulse oximeter reading, monitor moved to toe and pt sats 93-96%. Pt reports good pain control. OT will d/c at this time. Recommend home with 24/7 assist from spouse when medically ready.  Plan of Care Reviewed With: patient, spouse  Outcome Evaluation: Pt is A/Ox4 and motivated to work with therapy. Education reviewed for R shoulder precautions, sling teaching, and ADL retraining. Pt needs Mod A UB ADLs and sling mgmt. RUE HEP complete with AROM @ hand, wrist, and elbow only. Spouse present and participating in all teaching. Pt is up in room and transfering with CGAx1. No LOB. Pt's thick gel manicure and cold fingers appear to disrupt pulse oximeter reading, monitor moved to toe and pt sats 93-96%. Pt reports good pain  control. OT will d/c at this time. Recommend home with 24/7 assist from spouse when medically ready.     Time Calculation:         Time Calculation- OT       Row Name 04/25/25 1413 04/25/25 1336          Time Calculation- OT    OT Start Time 1413  -TB --     OT Received On 04/25/25  -TB --        Timed Charges    75561 - OT Therapeutic Exercise Minutes 10  -TB --     07330 - Gait Training Minutes  -- 12  -AC     79751 - OT Self Care/Mgmt Minutes 43  -TB --        Total Minutes    Timed Charges Total Minutes 53  -TB 12  -AC      Total Minutes 53  -TB 12  -AC               User Key  (r) = Recorded By, (t) = Taken By, (c) = Cosigned By      Initials Name Provider Type    TB Lawanda Naranjo OT Occupational Therapist    AC Thu Valle, PT Physical Therapist                  Therapy Charges for Today       Code Description Service Date Service Provider Modifiers Qty    79945315734 HC OT THER PROC EA 15 MIN 4/25/2025 Lawanda Naranjo OT GO 1    30483180723 HC OT SELF CARE/MGMT/TRAIN EA 15 MIN 4/25/2025 Lawanda Naranjo OT GO 3               OT Discharge Summary  Anticipated Discharge Disposition (OT): home with 24/7 care    Lawanda Naranjo OT  4/25/2025

## 2025-04-26 VITALS
OXYGEN SATURATION: 91 % | SYSTOLIC BLOOD PRESSURE: 142 MMHG | DIASTOLIC BLOOD PRESSURE: 59 MMHG | WEIGHT: 127 LBS | HEART RATE: 75 BPM | BODY MASS INDEX: 21.68 KG/M2 | RESPIRATION RATE: 16 BRPM | HEIGHT: 64 IN | TEMPERATURE: 98.3 F

## 2025-04-26 LAB
INR PPP: 1.33 (ref 0.89–1.12)
PROTHROMBIN TIME: 17.3 SECONDS (ref 12.2–15.3)
QT INTERVAL: 384 MS
QTC INTERVAL: 440 MS

## 2025-04-26 PROCEDURE — A9270 NON-COVERED ITEM OR SERVICE: HCPCS | Performed by: INTERNAL MEDICINE

## 2025-04-26 PROCEDURE — 97116 GAIT TRAINING THERAPY: CPT

## 2025-04-26 PROCEDURE — 63710000001 LEVOTHYROXINE 88 MCG TABLET: Performed by: INTERNAL MEDICINE

## 2025-04-26 PROCEDURE — 25010000002 ENOXAPARIN PER 10 MG: Performed by: ORTHOPAEDIC SURGERY

## 2025-04-26 PROCEDURE — 63710000001 EMPAGLIFLOZIN 10 MG TABLET: Performed by: INTERNAL MEDICINE

## 2025-04-26 PROCEDURE — 94761 N-INVAS EAR/PLS OXIMETRY MLT: CPT

## 2025-04-26 PROCEDURE — 63710000001 PANTOPRAZOLE 40 MG TABLET DELAYED-RELEASE: Performed by: INTERNAL MEDICINE

## 2025-04-26 PROCEDURE — 85610 PROTHROMBIN TIME: CPT | Performed by: INTERNAL MEDICINE

## 2025-04-26 PROCEDURE — 94799 UNLISTED PULMONARY SVC/PX: CPT

## 2025-04-26 PROCEDURE — 63710000001 LISINOPRIL 20 MG TABLET: Performed by: INTERNAL MEDICINE

## 2025-04-26 PROCEDURE — 97530 THERAPEUTIC ACTIVITIES: CPT

## 2025-04-26 PROCEDURE — 63710000001 REVEFENACIN 175 MCG/3ML SOLUTION: Performed by: INTERNAL MEDICINE

## 2025-04-26 PROCEDURE — 63710000001 BUTALBITAL-ACETAMINOPHEN-CAFFEINE 50-325-40 MG TABLET: Performed by: INTERNAL MEDICINE

## 2025-04-26 PROCEDURE — 63710000001 SERTRALINE 25 MG TABLET: Performed by: INTERNAL MEDICINE

## 2025-04-26 PROCEDURE — 25010000002 KETOROLAC TROMETHAMINE PER 15 MG: Performed by: ORTHOPAEDIC SURGERY

## 2025-04-26 PROCEDURE — 63710000001 CLOPIDOGREL 75 MG TABLET: Performed by: INTERNAL MEDICINE

## 2025-04-26 RX ORDER — KETOROLAC TROMETHAMINE 15 MG/ML
15 INJECTION, SOLUTION INTRAMUSCULAR; INTRAVENOUS ONCE AS NEEDED
Status: COMPLETED | OUTPATIENT
Start: 2025-04-26 | End: 2025-04-26

## 2025-04-26 RX ORDER — ENOXAPARIN SODIUM 100 MG/ML
1 INJECTION SUBCUTANEOUS EVERY 12 HOURS SCHEDULED
Start: 2025-04-26

## 2025-04-26 RX ADMIN — REVEFENACIN 175 MCG: 175 SOLUTION RESPIRATORY (INHALATION) at 07:01

## 2025-04-26 RX ADMIN — ENOXAPARIN SODIUM 60 MG: 60 INJECTION SUBCUTANEOUS at 08:59

## 2025-04-26 RX ADMIN — ENOXAPARIN SODIUM 60 MG: 60 INJECTION SUBCUTANEOUS at 09:17

## 2025-04-26 RX ADMIN — EMPAGLIFLOZIN 10 MG: 10 TABLET, FILM COATED ORAL at 08:59

## 2025-04-26 RX ADMIN — BUTALBITAL, ACETAMINOPHEN, AND CAFFEINE 1 TABLET: 325; 50; 40 TABLET ORAL at 09:40

## 2025-04-26 RX ADMIN — PANTOPRAZOLE SODIUM 40 MG: 40 TABLET, DELAYED RELEASE ORAL at 08:57

## 2025-04-26 RX ADMIN — LEVOTHYROXINE SODIUM 88 MCG: 0.09 TABLET ORAL at 08:59

## 2025-04-26 RX ADMIN — SERTRALINE 12.5 MG: 25 TABLET, FILM COATED ORAL at 08:58

## 2025-04-26 RX ADMIN — LISINOPRIL 20 MG: 20 TABLET ORAL at 08:58

## 2025-04-26 RX ADMIN — KETOROLAC TROMETHAMINE 15 MG: 15 INJECTION, SOLUTION INTRAMUSCULAR; INTRAVENOUS at 14:02

## 2025-04-26 RX ADMIN — ARFORMOTEROL TARTRATE 15 MCG: 15 SOLUTION RESPIRATORY (INHALATION) at 07:01

## 2025-04-26 RX ADMIN — CLOPIDOGREL BISULFATE 75 MG: 75 TABLET, FILM COATED ORAL at 08:58

## 2025-04-26 NOTE — PLAN OF CARE
Problem: Adult Inpatient Plan of Care  Goal: Patient-Specific Goal (Individualized)  Outcome: Not Progressing     Problem: Shoulder Arthroplasty (Total, Geovanny, Reverse)  Goal: Optimal Pain Control and Function  Outcome: Not Progressing  Intervention: Prevent or Manage Pain  Recent Flowsheet Documentation  Taken 4/26/2025 0000 by Kiana Aragon RN  Pain Management Interventions: pillow support provided  Diversional Activities: television  Taken 4/25/2025 2120 by Kiana Aragon RN  Pain Management Interventions: pillow support provided  Diversional Activities: television  Taken 4/25/2025 2050 by Kiana Aragon RN  Pain Management Interventions: pain medication given  Taken 4/25/2025 1912 by Kiana Aragon RN  Pain Management Interventions: pillow support provided  Diversional Activities: television     Problem: Adult Inpatient Plan of Care  Goal: Plan of Care Review  Outcome: Progressing  Goal: Patient-Specific Goal (Individualized)  Outcome: Not Progressing  Goal: Absence of Hospital-Acquired Illness or Injury  Outcome: Progressing  Intervention: Identify and Manage Fall Risk  Recent Flowsheet Documentation  Taken 4/26/2025 0000 by Kiana Aragon RN  Safety Promotion/Fall Prevention:   activity supervised   clutter free environment maintained   elopement precautions   fall prevention program maintained   gait belt   mobility aid in reach   lighting adjusted   nonskid shoes/slippers when out of bed   room organization consistent   safety round/check completed   toileting scheduled  Taken 4/25/2025 2120 by Kiana Aragon RN  Safety Promotion/Fall Prevention: activity supervised  Taken 4/25/2025 1912 by Kiana Aragon RN  Safety Promotion/Fall Prevention:   activity supervised   clutter free environment maintained   elopement precautions   fall prevention program maintained   gait belt   mobility aid in reach   lighting adjusted   nonskid shoes/slippers when out of bed   room organization consistent   safety round/check  completed   toileting scheduled  Intervention: Prevent Skin Injury  Recent Flowsheet Documentation  Taken 4/26/2025 0000 by Kiana Aragon RN  Body Position: position changed independently  Skin Protection: incontinence pads utilized  Taken 4/25/2025 2120 by Kiana Aragon RN  Body Position: position maintained  Skin Protection: incontinence pads utilized  Taken 4/25/2025 1912 by Kiana Aragon RN  Body Position: position changed independently  Skin Protection: incontinence pads utilized  Intervention: Prevent and Manage VTE (Venous Thromboembolism) Risk  Recent Flowsheet Documentation  Taken 4/26/2025 0000 by Kiana Aragon RN  VTE Prevention/Management:   bilateral   SCDs (sequential compression devices) on  Taken 4/25/2025 2120 by Kiana Aragon RN  VTE Prevention/Management:   bilateral   SCDs (sequential compression devices) on  Taken 4/25/2025 1912 by Kiana Aragon RN  VTE Prevention/Management:   bilateral   SCDs (sequential compression devices) on  Intervention: Prevent Infection  Recent Flowsheet Documentation  Taken 4/26/2025 0000 by Kiana Aragon RN  Infection Prevention:   environmental surveillance performed   hand hygiene promoted   rest/sleep promoted  Taken 4/25/2025 2120 by Kiana Aragon RN  Infection Prevention:   environmental surveillance performed   rest/sleep promoted  Taken 4/25/2025 1912 by Kiana Aragon RN  Infection Prevention:   environmental surveillance performed   hand hygiene promoted   rest/sleep promoted  Goal: Optimal Comfort and Wellbeing  Outcome: Progressing  Intervention: Monitor Pain and Promote Comfort  Recent Flowsheet Documentation  Taken 4/26/2025 0000 by Kiana Aragon RN  Pain Management Interventions: pillow support provided  Taken 4/25/2025 2120 by Kiana Aragon RN  Pain Management Interventions: pillow support provided  Taken 4/25/2025 2050 by Kiana Aragon RN  Pain Management Interventions: pain medication given  Taken 4/25/2025 1912 by Kiana Aragon RN  Pain Management  Interventions: pillow support provided  Intervention: Provide Person-Centered Care  Recent Flowsheet Documentation  Taken 4/26/2025 0000 by Kiana Aragon RN  Trust Relationship/Rapport:   care explained   thoughts/feelings acknowledged  Taken 4/25/2025 2120 by Kiana Aragon RN  Trust Relationship/Rapport:   care explained   thoughts/feelings acknowledged  Taken 4/25/2025 1912 by Kiana Aragon RN  Trust Relationship/Rapport:   care explained   thoughts/feelings acknowledged  Goal: Readiness for Transition of Care  Outcome: Progressing     Problem: Comorbidity Management  Goal: Blood Pressure in Desired Range  Outcome: Progressing     Problem: Skin Injury Risk Increased  Goal: Skin Health and Integrity  Outcome: Progressing  Intervention: Optimize Skin Protection  Recent Flowsheet Documentation  Taken 4/26/2025 0000 by Kiana Aragon RN  Activity Management: activity encouraged  Pressure Reduction Techniques: frequent weight shift encouraged  Head of Bed (HOB) Positioning: HOB at 20-30 degrees  Pressure Reduction Devices: pressure-redistributing mattress utilized  Skin Protection: incontinence pads utilized  Taken 4/25/2025 2120 by Kiana Aragon RN  Activity Management: activity encouraged  Pressure Reduction Techniques: frequent weight shift encouraged  Head of Bed (HOB) Positioning: HOB elevated  Pressure Reduction Devices: pressure-redistributing mattress utilized  Skin Protection: incontinence pads utilized  Taken 4/25/2025 1912 by Kiana Aragon RN  Activity Management: activity encouraged  Pressure Reduction Techniques: frequent weight shift encouraged  Head of Bed (HOB) Positioning: HOB at 30-45 degrees  Pressure Reduction Devices: pressure-redistributing mattress utilized  Skin Protection: incontinence pads utilized  Intervention: Promote and Optimize Oral Intake  Recent Flowsheet Documentation  Taken 4/26/2025 0000 by Kiana Aragon RN  Oral Nutrition Promotion: rest periods promoted  Taken 4/25/2025 2120 by  Aragon, Kiana, RN  Oral Nutrition Promotion: rest periods promoted  Taken 4/25/2025 1912 by Kiana Aragon RN  Oral Nutrition Promotion: rest periods promoted     Problem: Shoulder Arthroplasty (Total, Geovanny, Reverse)  Goal: Optimal Coping  Outcome: Progressing  Intervention: Support Psychosocial Response to Surgery and Mobility Changes  Recent Flowsheet Documentation  Taken 4/26/2025 0000 by Kiana Aragon RN  Supportive Measures:   active listening utilized   self-care encouraged  Taken 4/25/2025 2120 by Kiana Aragon RN  Supportive Measures:   active listening utilized   self-care encouraged   verbalization of feelings encouraged  Taken 4/25/2025 1912 by Kiana Aragon RN  Supportive Measures:   active listening utilized   self-care encouraged  Goal: Absence of Bleeding  Outcome: Progressing  Intervention: Monitor and Manage Bleeding  Recent Flowsheet Documentation  Taken 4/26/2025 0000 by Kiana Aragon RN  Bleeding Management: dressing monitored  Taken 4/25/2025 2120 by Kiana Aragon RN  Bleeding Management: dressing monitored  Taken 4/25/2025 1912 by Kiana Aragon RN  Bleeding Management: dressing monitored  Goal: Effective Bowel Elimination  Outcome: Progressing  Intervention: Enhance Bowel Motility and Elimination  Recent Flowsheet Documentation  Taken 4/26/2025 0000 by Kiana Aragon RN  Bowel Motility Enhancement:   ambulation promoted   fluid intake encouraged  Taken 4/25/2025 2120 by Kiana Aragon RN  Bowel Elimination Management: toileting offered  Bowel Motility Enhancement:   ambulation promoted   fluid intake encouraged  Taken 4/25/2025 1912 by Kiana Aragon RN  Bowel Elimination Management: toileting offered  Bowel Motility Enhancement:   ambulation promoted   fluid intake encouraged  Goal: Fluid and Electrolyte Balance  Outcome: Progressing  Intervention: Monitor and Manage Fluid and Electrolyte Balance  Recent Flowsheet Documentation  Taken 4/26/2025 0000 by Kiana Aragon RN  Fluid/Electrolyte  Management: fluids provided  Taken 4/25/2025 2120 by Kiana Aragon RN  Fluid/Electrolyte Management: fluids provided  Taken 4/25/2025 1912 by Kiana Aragon RN  Fluid/Electrolyte Management: fluids provided  Goal: Optimal Functional Ability  Outcome: Progressing  Intervention: Promote Optimal Functional Status  Recent Flowsheet Documentation  Taken 4/26/2025 0000 by Kiana Aragon RN  Activity Management: activity encouraged  Self-Care Promotion: independence encouraged  Taken 4/25/2025 2120 by Kaina Aragon RN  Activity Management: activity encouraged  Assistive Device Utilized: gait belt  Self-Care Promotion: independence encouraged  Taken 4/25/2025 1912 by Kiana Aragon RN  Activity Management: activity encouraged  Assistive Device Utilized: gait belt  Self-Care Promotion: independence encouraged  Goal: Absence of Infection Signs and Symptoms  Outcome: Progressing  Intervention: Prevent or Manage Infection  Recent Flowsheet Documentation  Taken 4/26/2025 0000 by Kiana Aragon RN  Infection Management: aseptic technique maintained  Fever Reduction/Comfort Measures: lightweight bedding  Taken 4/25/2025 2120 by Kiana Aragon RN  Infection Management: aseptic technique maintained  Fever Reduction/Comfort Measures: lightweight bedding  Taken 4/25/2025 1912 by Kiana Aragon RN  Infection Management: aseptic technique maintained  Fever Reduction/Comfort Measures: lightweight bedding  Goal: Intact Neurovascular Status  Outcome: Progressing  Intervention: Prevent or Manage Neurovascular Compromise  Recent Flowsheet Documentation  Taken 4/26/2025 0000 by Kiana Aragon RN  Compartment Syndrome Management: active flexion/extension encouraged  Compartment Syndrome Surveillance: no pain with passive muscle stretch  Neurovascular Pressure Management: extremity positioned at heart level  Taken 4/25/2025 2120 by Kiana Aragon RN  Compartment Syndrome Management: active flexion/extension encouraged  Compartment Syndrome  Surveillance: no pain with passive muscle stretch  Neurovascular Pressure Management: extremity positioned at heart level  Taken 4/25/2025 1912 by Kiana Aragon RN  Compartment Syndrome Management: active flexion/extension encouraged  Compartment Syndrome Surveillance: no pain with passive muscle stretch  Neurovascular Pressure Management: extremity positioned at heart level  Goal: Anesthesia/Sedation Recovery  Outcome: Progressing  Intervention: Optimize Anesthesia Recovery  Recent Flowsheet Documentation  Taken 4/26/2025 0336 by Kiana Aragon RN  Patient Tolerance (IS): good  Taken 4/26/2025 0200 by Kiana Aragon RN  Patient Tolerance (IS): good  Taken 4/26/2025 0000 by Kiana Aragon RN  Stabilization Measures: other (see comments)  Patient Tolerance (IS): good  Safety Promotion/Fall Prevention:   activity supervised   clutter free environment maintained   elopement precautions   fall prevention program maintained   gait belt   mobility aid in reach   lighting adjusted   nonskid shoes/slippers when out of bed   room organization consistent   safety round/check completed   toileting scheduled  Administration (IS): self-administered  Reorientation Measures: glasses use encouraged  Taken 4/25/2025 2120 by Kiana Aragon RN  Stabilization Measures: other (see comments)  Patient Tolerance (IS): good  Safety Promotion/Fall Prevention: activity supervised  Administration (IS): self-administered  Reorientation Measures: glasses use encouraged  Taken 4/25/2025 1912 by Kiana Aragon RN  Stabilization Measures: other (see comments)  Patient Tolerance (IS): good  Safety Promotion/Fall Prevention:   activity supervised   clutter free environment maintained   elopement precautions   fall prevention program maintained   gait belt   mobility aid in reach   lighting adjusted   nonskid shoes/slippers when out of bed   room organization consistent   safety round/check completed   toileting scheduled  Administration (IS):  self-administered  Reorientation Measures: glasses use encouraged  Goal: Optimal Pain Control and Function  Outcome: Not Progressing  Intervention: Prevent or Manage Pain  Recent Flowsheet Documentation  Taken 4/26/2025 0000 by Kiana Aragon RN  Pain Management Interventions: pillow support provided  Diversional Activities: television  Taken 4/25/2025 2120 by Kiana Aragon RN  Pain Management Interventions: pillow support provided  Diversional Activities: television  Taken 4/25/2025 2050 by Kiana Aragon RN  Pain Management Interventions: pain medication given  Taken 4/25/2025 1912 by Kiana Aragon RN  Pain Management Interventions: pillow support provided  Diversional Activities: television  Goal: Nausea and Vomiting Relief  Outcome: Progressing  Goal: Effective Urinary Elimination  Outcome: Progressing  Intervention: Monitor and Manage Urinary Retention  Recent Flowsheet Documentation  Taken 4/26/2025 0000 by Kiana Aragon RN  Urinary Elimination Promotion: toileting offered  Taken 4/25/2025 2120 by Kiana Aragon RN  Urinary Elimination Promotion: toileting offered  Taken 4/25/2025 1912 by Kiana Aragon RN  Urinary Elimination Promotion: toileting offered     Problem: Fall Injury Risk  Goal: Absence of Fall and Fall-Related Injury  Outcome: Progressing  Intervention: Identify and Manage Contributors  Recent Flowsheet Documentation  Taken 4/26/2025 0000 by Kiana Aragon RN  Self-Care Promotion: independence encouraged  Taken 4/25/2025 2120 by Kiana Aragon RN  Self-Care Promotion: independence encouraged  Taken 4/25/2025 1912 by Kiana Aragon RN  Self-Care Promotion: independence encouraged  Intervention: Promote Injury-Free Environment  Recent Flowsheet Documentation  Taken 4/26/2025 0000 by Kiana Aragon RN  Safety Promotion/Fall Prevention:   activity supervised   clutter free environment maintained   elopement precautions   fall prevention program maintained   gait belt   mobility aid in reach   lighting  adjusted   nonskid shoes/slippers when out of bed   room organization consistent   safety round/check completed   toileting scheduled  Taken 4/25/2025 2120 by Kiana Aragon, RN  Safety Promotion/Fall Prevention: activity supervised  Taken 4/25/2025 1912 by Kiana Aragon, RN  Safety Promotion/Fall Prevention:   activity supervised   clutter free environment maintained   elopement precautions   fall prevention program maintained   gait belt   mobility aid in reach   lighting adjusted   nonskid shoes/slippers when out of bed   room organization consistent   safety round/check completed   toileting scheduled   Goal Outcome Evaluation:                                             No

## 2025-04-26 NOTE — THERAPY TREATMENT NOTE
Patient Name: Priscilla Purdy  : 1945    MRN: [id number]                              Today's Date: 2025       Admit Date: 2025    Visit Dx: No diagnosis found.  Patient Active Problem List   Diagnosis    Pain in thoracic spine    Osteoarthritis of thoracolumbar spine    Rotator cuff arthropathy of right shoulder    Rotator cuff arthropathy    HTN (hypertension)    Hyperlipidemia    COPD (chronic obstructive pulmonary disease)    CAD (coronary artery disease), s/p stents    Status post reverse total replacement of right shoulder    Anticardiolipin syndrome     Past Medical History:   Diagnosis Date    Anticardiolipin syndrome     CHF (congestive heart failure)     Cochlear implant in place     right    COPD (chronic obstructive pulmonary disease)     Coronary artery disease     MAGDIEL x2 (), MAGDIEL x3 ()    Disease of thyroid gland     hypothyroid    DVT (deep venous thrombosis)     Hyperlipidemia     Hypertension     MI (myocardial infarction)     Raynaud's disease      Past Surgical History:   Procedure Laterality Date    CHOLECYSTECTOMY      COLONOSCOPY      CORONARY ANGIOPLASTY WITH STENT PLACEMENT      MAGDIEL x 2    CORONARY ANGIOPLASTY WITH STENT PLACEMENT      MAGDIEL x3    EAR MASTOIDECTOMY W/ COCHLEAR IMPLANT W/ LANDMARK Right     ENDOMETRIAL ABLATION      LEG SURGERY Right     x2    OVARIAN CYST REMOVAL      TOTAL SHOULDER ARTHROPLASTY W/ DISTAL CLAVICLE EXCISION Right 2025    Procedure: REVERSE TOTAL SHOULDER ARTHROPLASTY RIGHT;  Surgeon: Norbert Delcid MD;  Location: [facility];  Service: Orthopedics;  Laterality: Right;    TUBAL ABDOMINAL LIGATION        General Information       Row Name 25 0951          Physical Therapy Time and Intention    Document Type therapy note (daily note)  -     Mode of Treatment physical therapy  -       Row Name 25 0951          General Information    Patient Profile Reviewed yes  -     Existing Precautions/Restrictions  right;shoulder;non-weight bearing;other (see comments)  s/p R rTSA with NWB precautions, [place] with abduction support  -     Barriers to Rehab none identified  -       Row Name 04/26/25 0951          Cognition    Orientation Status (Cognition) oriented x 4  -       Row Name 04/26/25 0951          Safety Issues/Impairments Affecting Functional Mobility    Safety Issues Affecting Function (Mobility) awareness of need for assistance;insight into deficits/self-awareness;safety precautions follow-through/compliance  -     Impairments Affecting Function (Mobility) balance;endurance/activity tolerance;pain;strength;range of motion (ROM)  -               User Key  (r) = Recorded By, (t) = Taken By, (c) = Cosigned By      Initials Name Provider Type     Shayna Ramirez, PT Physical Therapist                   Mobility       Row Name 04/26/25 0952          Bed Mobility    Bed Mobility supine-sit;sit-supine  -     Supine-Sit [place] (Bed Mobility) standby assist  -     Sit-Supine [place] (Bed Mobility) standby assist  -     Assistive Device (Bed Mobility) head of bed elevated  -     Comment, (Bed Mobility) Reviewed RUE NWB  -[facility] Name 04/26/25 0952          Transfers    Comment, (Transfers) VCs for hand placement  -[facility] Name 04/26/25 0952          Sit-Stand Transfer    Sit-Stand [place] (Transfers) standby assist;verbal cues  -     Assistive Device (Sit-Stand Transfers) cane, straight  -     Comment, (Sit-Stand Transfer) STS x2 from EOB  -       Row Name 04/26/25 0952          Gait/Stairs (Locomotion)    [place] Level (Gait) contact guard;1 person assist;verbal cues  -     Assistive Device (Gait) cane, straight  -     Patient was able to Ambulate yes  -     Distance in Feet (Gait) 180  -     Deviations/Abnormal Patterns (Gait) callum decreased;gait speed decreased;base of support, narrow  -     Bilateral Gait Deviations heel strike decreased  -      Comment, (Gait/Stairs) Pt demo step through gait pattern w/ decreased step length. Mildly unsteady w/o overt LOB. VCs for upright posture w/ forward gaze. Pt ambulated on RA w/ SpO2 91% after ambulation. Pt fatigues quickly and required 2 standing rest breaks. Denied dizziness  -       Row Name 04/26/25 0952          Mobility    Extremity Weight-bearing Status right upper extremity  -     Right Upper Extremity (Weight-bearing Status) non weight-bearing (NWB)   -               User Key  (r) = Recorded By, (t) = Taken By, (c) = Cosigned By      Initials Name Provider Type     Shayna Ramirez PT Physical Therapist                   Obj/Interventions       Row Name 04/26/25 1104          Balance    Balance Assessment sitting static balance;sitting dynamic balance;standing static balance;standing dynamic balance  -     Static Sitting Balance supervision  -     Dynamic Sitting Balance supervision  -     Position, Sitting Balance unsupported;sitting edge of bed  -     Static Standing Balance contact guard  -     Dynamic Standing Balance contact guard;verbal cues  -     Position/Device Used, Standing Balance supported;cane, straight  -     Balance Interventions sitting;standing;sit to stand;supported;static;dynamic  -               User Key  (r) = Recorded By, (t) = Taken By, (c) = Cosigned By      Initials Name Provider Type     Shayna Ramirez, DANIEL Physical Therapist                   Goals/Plan    No documentation.                  Clinical Impression       Row Name 04/26/25 1105          Pain    Pretreatment Pain Rating 0/10 - no pain  -     Posttreatment Pain Rating 2/10  -     Pain Location extremity;shoulder  -     Pain Side/Orientation right;generalized  -     Pain Management Interventions activity modification encouraged;exercise or physical activity utilized;positioning techniques utilized  -     Response to Pain Interventions activity level improved;functional ability  improved;activity participation with tolerable pain  -       Row Name 04/26/25 1105          Plan of Care Review    Plan of Care Reviewed With patient;spouse  -     Progress improving  -     Outcome Evaluation Pt continues to present below baseline function d/t RUE NWB, generalized weakness, balance deficits, and decreased activity tolerance. Pt ambulated 180 ft w/ SPC, CGA. Pt ambulated on RA w/ SpO2 91% after ambulation. Pt would continue to benefit from IP PT services while hospitalized. Recommend home w/ 24/7 assist and HHPT once medically appropriate.  -       Row Name 04/26/25 1105          Vital Signs    Pre Systolic BP Rehab 164  -     Pre Treatment Diastolic BP 64  -LH     Post Systolic BP Rehab 144  -LH     Post Treatment Diastolic BP 62  -LH     Pre SpO2 (%) 93  -LH     O2 Delivery Pre Treatment room air  -     Intra SpO2 (%) 91  -LH     O2 Delivery Intra Treatment room air  -     Post SpO2 (%) 92  -LH     O2 Delivery Post Treatment room air  -     Pre Patient Position Supine  -     Intra Patient Position Sitting  -     Post Patient Position Sitting  -       Row Name 04/26/25 1105          Positioning and Restraints    Pre-Treatment Position in bed  -     Post Treatment Position chair  -     In Chair notified nsg;reclined;sitting;call light within reach;encouraged to call for assist;exit alarm on;with family/caregiver;waffle cushion;legs elevated;RUE elevated;with brace  -               User Key  (r) = Recorded By, (t) = Taken By, (c) = Cosigned By      Initials Name Provider Type     Shayna Ramirez, DANIEL Physical Therapist                   Outcome Measures       Row Name 04/26/25 1107 04/26/25 0820       How much help from another person do you currently need...    Turning from your back to your side while in flat bed without using bedrails? 3  - 3  -LHA    Moving from lying on back to sitting on the side of a flat bed without bedrails? 3  - 3  -LHA    Moving to and from  a bed to a chair (including a wheelchair)? 3  - 3  -LHA    Standing up from a chair using your arms (e.g., wheelchair, bedside chair)? 3  - 3  -LHA    Climbing 3-5 steps with a railing? 3  - 3  -LHA    To walk in hospital room? 3  - 3  -LHA    AM-PAC 6 Clicks Score (PT) 18  - 18  -A    Highest Level of Mobility Goal 6 --> Walk 10 steps or more  - 6 --> Walk 10 steps or more  -[facility]      Row Name 04/26/25 1107          Functional Assessment    Outcome Measure Options AM-PAC 6 Clicks Basic Mobility (PT)  -               User Key  (r) = Recorded By, (t) = Taken By, (c) = Cosigned By      Initials Name Provider Type    [facility] Melonie Dixon, RN Registered Nurse     Shayna Ramirez, PT Physical Therapist                                 Physical Therapy Education       Title: PT OT SLP Therapies (Done)       Topic: Physical Therapy (Done)       Point: Mobility training (Done)       Learning Progress Summary            Patient Acceptance, E, VU,NR by  at 4/26/2025 1108    Acceptance, E, VU by  at 4/25/2025 1335                      Point: Home exercise program (Done)       Learning Progress Summary            Patient Acceptance, E, VU by  at 4/25/2025 1335                      Point: Body mechanics (Done)       Learning Progress Summary            Patient Acceptance, E, VU,NR by  at 4/26/2025 1108    Acceptance, E, VU by  at 4/25/2025 1335                      Point: Precautions (Done)       Learning Progress Summary            Patient Acceptance, E, VU,NR by  at 4/26/2025 1108    Acceptance, E, VU by  at 4/25/2025 1335                                      User Key       Initials Effective Dates Name Provider Type Discipline     09/21/23 -  Shayna Ramirez, PT Physical Therapist PT     07/11/24 -  Thu Valle, PT Physical Therapist PT                  PT Recommendation and Plan     Progress: improving  Outcome Evaluation: Pt continues to present below baseline function d/t RUE NWB, generalized  weakness, balance deficits, and decreased activity tolerance. Pt ambulated 180 ft w/ SPC, CGA. Pt ambulated on RA w/ SpO2 91% after ambulation. Pt would continue to benefit from IP PT services while hospitalized. Recommend home w/ 24/7 assist and HHPT once medically appropriate.     Time Calculation:         PT Charges       Row Name 04/26/25 1108             Time Calculation    Start Time 0909  -      PT Received On 04/26/25  -      PT Goal Re-Cert Due Date 05/05/25  -         Timed Charges    81257 - Gait Training Minutes  15  -      27491 - PT Therapeutic Activity Minutes 9  -         Total Minutes    Timed Charges Total Minutes 24  -       Total Minutes 24  -                User Key  (r) = Recorded By, (t) = Taken By, (c) = Cosigned By      Initials Name Provider Type     Shayna Ramirez, PT Physical Therapist                  Therapy Charges for Today       Code Description Service Date Service Provider Modifiers Qty    04730948875 HC GAIT TRAINING EA 15 MIN 4/26/2025 Shayna Ramirez, PT GP 1    [id number]  PT THERAPEUTIC ACT EA 15 MIN 4/26/2025 Shayna Ramirez, PT GP 1            PT G-Codes  Outcome Measure Options: AM-PAC 6 Clicks Basic Mobility (PT)  AM-PAC 6 Clicks Score (PT): 18  AM-PAC 6 Clicks Score (OT): 15  PT Discharge Summary  Anticipated Discharge Disposition (PT): home with 24/7 care, home with home health    Shayna Ramirez PT  4/26/2025

## 2025-04-26 NOTE — PLAN OF CARE
Goal Outcome Evaluation:  Plan of Care Reviewed With: patient, spouse        Progress: improving  Outcome Evaluation: Pt continues to present below baseline function d/t RUE NWB, generalized weakness, balance deficits, and decreased activity tolerance. Pt ambulated 180 ft w/ SPC, CGA. Pt ambulated on RA w/ SpO2 91% after ambulation. Pt would continue to benefit from IP PT services while hospitalized. Recommend home w/ 24/7 assist and HHPT once medically appropriate.    Anticipated Discharge Disposition (PT): home with 24/7 care, home with home health

## (undated) DEVICE — 450 ML BOTTLE OF 0.05% CHLORHEXIDINE GLUCONATE IN 99.95% STERILE WATER FOR IRRIGATION, USP AND APPLICATOR.: Brand: IRRISEPT ANTIMICROBIAL WOUND LAVAGE

## (undated) DEVICE — SOL ISO/ALC RUB 70PCT 4OZ

## (undated) DEVICE — BLANKT WARM LOWR/BDY 100X120CM

## (undated) DEVICE — INTENDED FOR TISSUE SEPARATION, AND OTHER PROCEDURES THAT REQUIRE A SHARP SURGICAL BLADE TO PUNCTURE OR CUT.: Brand: BARD-PARKER ® CARBON RIB-BACK BLADES

## (undated) DEVICE — PENCL SMOKE/EVAC MEGADYNE TELESCP 10FT

## (undated) DEVICE — PK MAJ SHLDR SPLT 10

## (undated) DEVICE — SPNG GZ WOVN 4X4IN 12PLY 10/BX STRL

## (undated) DEVICE — ANTIBACTERIAL UNDYED BRAIDED (POLYGLACTIN 910), SYNTHETIC ABSORBABLE SUTURE: Brand: COATED VICRYL

## (undated) DEVICE — PULLOVER TOGA, 2X LARGE: Brand: FLYTE, SURGICOOL

## (undated) DEVICE — SUT VIC 2/0 CT2 27IN J269H

## (undated) DEVICE — 3M™ IOBAN™ 2 ANTIMICROBIAL INCISE DRAPE 6651EZ: Brand: IOBAN™ 2

## (undated) DEVICE — PATIENT RETURN ELECTRODE, SINGLE-USE, CONTACT QUALITY MONITORING, ADULT, WITH 9FT CORD, FOR PATIENTS WEIGING OVER 33LBS. (15KG): Brand: MEGADYNE

## (undated) DEVICE — GLV SURG SENSICARE PI ORTHO SZ7.5 LF STRL

## (undated) DEVICE — TRAP FLD MINIVAC MEGADYNE 100ML

## (undated) DEVICE — SKN PREP SPNG STKS PVP PNT STR: Brand: MEDLINE INDUSTRIES, INC.

## (undated) DEVICE — UNDERGLV SURG BIOGEL INDICAT PI SZ8 BLU

## (undated) DEVICE — INTENDED TO SUPPORT AND MAINTAIN THE POSITION OF AN ANESTHETIZED PATIENT DURING SURGERY: Brand: ERIN BEACH CHAIR FACE MASK

## (undated) DEVICE — STRYKER PERFORMANCE SERIES SAGITTAL BLADE: Brand: STRYKER PERFORMANCE SERIES